# Patient Record
Sex: MALE | Race: OTHER | NOT HISPANIC OR LATINO | Employment: OTHER | URBAN - METROPOLITAN AREA
[De-identification: names, ages, dates, MRNs, and addresses within clinical notes are randomized per-mention and may not be internally consistent; named-entity substitution may affect disease eponyms.]

---

## 2017-03-20 ENCOUNTER — TELEPHONE (OUTPATIENT)
Dept: PULMONOLOGY | Facility: HOSPICE | Age: 67
End: 2017-03-20

## 2017-03-20 DIAGNOSIS — R06.00 DYSPNEA, UNSPECIFIED TYPE: ICD-10-CM

## 2017-05-08 ENCOUNTER — OFFICE VISIT (OUTPATIENT)
Dept: PULMONOLOGY | Facility: HOSPICE | Age: 67
End: 2017-05-08
Payer: COMMERCIAL

## 2017-05-08 ENCOUNTER — NON-PROVIDER VISIT (OUTPATIENT)
Dept: PULMONOLOGY | Facility: HOSPICE | Age: 67
End: 2017-05-08
Payer: COMMERCIAL

## 2017-05-08 VITALS
TEMPERATURE: 97.9 F | BODY MASS INDEX: 36.58 KG/M2 | DIASTOLIC BLOOD PRESSURE: 74 MMHG | WEIGHT: 276 LBS | HEART RATE: 80 BPM | HEIGHT: 73 IN | RESPIRATION RATE: 18 BRPM | SYSTOLIC BLOOD PRESSURE: 120 MMHG

## 2017-05-08 DIAGNOSIS — G47.33 OSA (OBSTRUCTIVE SLEEP APNEA): ICD-10-CM

## 2017-05-08 DIAGNOSIS — R06.00 DYSPNEA, UNSPECIFIED TYPE: ICD-10-CM

## 2017-05-08 DIAGNOSIS — J45.30 MILD PERSISTENT ASTHMA WITHOUT COMPLICATION: ICD-10-CM

## 2017-05-08 DIAGNOSIS — J30.1 SEASONAL ALLERGIC RHINITIS DUE TO POLLEN: ICD-10-CM

## 2017-05-08 PROCEDURE — 99214 OFFICE O/P EST MOD 30 MIN: CPT | Mod: 25 | Performed by: INTERNAL MEDICINE

## 2017-05-08 PROCEDURE — 94060 EVALUATION OF WHEEZING: CPT | Performed by: INTERNAL MEDICINE

## 2017-05-08 ASSESSMENT — PULMONARY FUNCTION TESTS
FEV1: 3.03
FEV1: 3.13
FEV1_PREDICTED: 80
FEV1/FVC_PREDICTED: 74.56
FVC_PREDICTED: 5.07
FEV1_PERCENT_CHANGE: 3
FEV1/FVC: 82.37
FEV1/FVC: 80
FVC_PERCENT_PREDICTED: 75
FEV1_PREDICTED: 3.78
FEV1/FVC_PERCENT_CHANGE: 0
FEV1_PERCENT_CHANGE: 0
FVC: 3.81
FVC: 3.8
FEV1/FVC_PERCENT_PREDICTED: 107

## 2017-05-08 NOTE — MR AVS SNAPSHOT
"        Harrison Gan   2017 1:40 PM   Office Visit   MRN: [id number]    Department:  [facility]   Dept Phone:  [phone]    Description:  Male : 1950   Provider:  Sara Perez M.D.           Reason for Visit     Results PFT results.      Allergies as of 2017     Allergen Noted Reactions    Demerol 2011       Lisinopril 2011         You were diagnosed with     Mild persistent asthma without complication   [[id number]]       FOREST (obstructive sleep apnea)   [[id number]]         Vital Signs     Blood Pressure Pulse Temperature Respirations Height Weight    120/74 mmHg 80 36.6 °C (97.9 °F) (Temporal) 18 1.854 m (6' 0.99\") 125.193 kg (276 lb)    Body Mass Index Smoking Status                36.42 kg/m2 Never Smoker           Basic Information     Date Of Birth Sex Race Ethnicity Preferred Language    1950 Male  or   Origin (Kosovan,Saudi Arabian,Marshallese,Cypriot, etc) English      Health Maintenance        Date Due Completion Dates    IMM DTaP/Tdap/Td Vaccine (1 - Tdap) 1969 ---    COLONOSCOPY 2000 ---    IMM ZOSTER VACCINE 2010 ---    IMM PNEUMOCOCCAL 65+ (ADULT) LOW/MEDIUM RISK SERIES (1 of 2 - PCV13) 2015 ---            Current Immunizations     Influenza TIV (IM) 10/1/2016      Below and/or attached are the medications your provider expects you to take. Review all of your home medications and newly ordered medications with your provider and/or pharmacist. Follow medication instructions as directed by your provider and/or pharmacist. Please keep your medication list with you and share with your provider. Update the information when medications are discontinued, doses are changed, or new medications (including over-the-counter products) are added; and carry medication information at all times in the event of emergency situations     Allergies:  DEMEROL - (reactions not documented)     LISINOPRIL - (reactions not documented)        "      Medications  Valid as of: May 08, 2017 -  2:20 PM    Generic Name Brand Name Tablet Size Instructions for use    Beclomethasone Dipropionate (Aero Soln) QVAR 80 MCG/ACT Inhale 1 Puff by mouth 2 times a day.        Cetirizine HCl (Tab) ZYRTEC 10 MG Take 10 mg by mouth every day.        Cetirizine HCl (Cap) Cetirizine HCl 10 MG Take  by mouth.        Cyclobenzaprine HCl (Tab) FLEXERIL 10 MG Take 10 mg by mouth 3 times a day as needed.        Esomeprazole Magnesium (CAPSULE DELAYED RELEASE) NEXIUM 40 MG Take 40 mg by mouth every morning before breakfast.        Levalbuterol Tartrate (Aerosol) XOPENEX HFA 45 MCG/ACT Inhale 1-2 Puffs by mouth every four hours as needed.        Mometasone Furoate (AEROSOL POWDER, BREATH ACTIVATED) Mometasone Furoate 110 MCG/INH Inhale  by mouth.        Montelukast Sodium (Tab) SINGULAIR 10 MG Take 10 mg by mouth every day.        Oxycodone-Acetaminophen (Tab) PERCOCET 5-325 MG Take 1-2 Tabs by mouth every four hours as needed.        Rosuvastatin Calcium (Tab) CRESTOR 20 MG Take 20 mg by mouth every evening.        Sennosides-Docusate Sodium (Tab) SENOKOT-S 8.6-50 MG Take 2 Tabs by mouth every day.        .                 Medicines prescribed today were sent to:     [facility] [address] [address]    Phone: [phone] Fax: [phone]    Open 24 Hours?: No      Medication refill instructions:       If your prescription bottle indicates you have medication refills left, it is not necessary to call your provider’s office. Please contact your pharmacy and they will refill your medication.    If your prescription bottle indicates you do not have any refills left, you may request refills at any time through one of the following ways: The online Fullscreen system (except Urgent Care), by calling your provider’s office, or by asking your pharmacy to contact your provider’s office with a refill request. Medication  refills are processed only during regular business hours and may not be available until the next business day. Your provider may request additional information or to have a follow-up visit with you prior to refilling your medication.   *Please Note: Medication refills are assigned a new Rx number when refilled electronically. Your pharmacy may indicate that no refills were authorized even though a new prescription for the same medication is available at the pharmacy. Please request the medicine by name with the pharmacy before contacting your provider for a refill.        Your To Do List     Future Labs/Procedures Complete By Expires    OVERNIGHT HOME SLEEP STUDY  As directed 5/8/2018    Comments:    By DME as pt in [facility] Status: Patient Declined

## 2017-05-08 NOTE — MR AVS SNAPSHOT
Harrison Gan   2017 1:00 PM   Non-Provider Visit   MRN: [id number]    Department:  [facility]   Dept Phone:  [phone]    Description:  Male : 1950   Provider:  Sara Perez M.D.           Reason for Visit     Shortness of Breath           Allergies as of 2017     Allergen Noted Reactions    Demerol 2011       Lisinopril 2011         You were diagnosed with     Dyspnea, unspecified type   [[id number]]         Basic Information     Date Of Birth Sex Race Ethnicity Preferred Language    1950 Male  or   Origin (French,German,Sammarinese,Libyan, etc) English      Your appointments     May 08, 2017  1:40 PM   Established Patient Pul with Sara Perez M.D.   [facility] (--)    [address]  [address]  [facility] [id number] [phone]              Health Maintenance        Date Due Completion Dates    IMM DTaP/Tdap/Td Vaccine (1 - Tdap) 1969 ---    COLONOSCOPY 2000 ---    IMM ZOSTER VACCINE 2010 ---    IMM PNEUMOCOCCAL 65+ (ADULT) LOW/MEDIUM RISK SERIES (1 of 2 - PCV13) 2015 ---            Current Immunizations     No immunizations on file.      Below and/or attached are the medications your provider expects you to take. Review all of your home medications and newly ordered medications with your provider and/or pharmacist. Follow medication instructions as directed by your provider and/or pharmacist. Please keep your medication list with you and share with your provider. Update the information when medications are discontinued, doses are changed, or new medications (including over-the-counter products) are added; and carry medication information at all times in the event of emergency situations     Allergies:  DEMEROL - (reactions not documented)     LISINOPRIL - (reactions not documented)               Medications  Valid as of: May 08, 2017 -  1:32 PM    Generic Name Brand Name Tablet  Size Instructions for use    Beclomethasone Dipropionate (Aero Soln) QVAR 80 MCG/ACT Inhale 1 Puff by mouth 2 times a day.        Cetirizine HCl (Tab) ZYRTEC 10 MG Take 10 mg by mouth every day.        Cetirizine HCl (Cap) Cetirizine HCl 10 MG Take  by mouth.        Cyclobenzaprine HCl (Tab) FLEXERIL 10 MG Take 10 mg by mouth 3 times a day as needed.        Esomeprazole Magnesium (CAPSULE DELAYED RELEASE) NEXIUM 40 MG Take 40 mg by mouth every morning before breakfast.        Levalbuterol Tartrate (Aerosol) XOPENEX HFA 45 MCG/ACT Inhale 1-2 Puffs by mouth every four hours as needed.        Mometasone Furoate (AEROSOL POWDER, BREATH ACTIVATED) Mometasone Furoate 110 MCG/INH Inhale  by mouth.        Montelukast Sodium (Tab) SINGULAIR 10 MG Take 10 mg by mouth every day.        Oxycodone-Acetaminophen (Tab) PERCOCET 5-325 MG Take 1-2 Tabs by mouth every four hours as needed.        Rosuvastatin Calcium (Tab) CRESTOR 20 MG Take 20 mg by mouth every evening.        Sennosides-Docusate Sodium (Tab) SENOKOT-S 8.6-50 MG Take 2 Tabs by mouth every day.        .                 Medicines prescribed today were sent to:     None      Medication refill instructions:       If your prescription bottle indicates you have medication refills left, it is not necessary to call your provider’s office. Please contact your pharmacy and they will refill your medication.    If your prescription bottle indicates you do not have any refills left, you may request refills at any time through one of the following ways: The online [employer] system (except Urgent Care), by calling your provider’s office, or by asking your pharmacy to contact your provider’s office with a refill request. Medication refills are processed only during regular business hours and may not be available until the next business day. Your provider may request additional information or to have a follow-up visit with you prior to refilling your medication.   *Please Note:  Medication refills are assigned a new Rx number when refilled electronically. Your pharmacy may indicate that no refills were authorized even though a new prescription for the same medication is available at the pharmacy. Please request the medicine by name with the pharmacy before contacting your provider for a refill.           MyChart Status: Patient Declined

## 2017-05-08 NOTE — PROCEDURES
Good patient effort & cooperation.  The results of this test meet the ATS standards for acceptability and repeatability.  Test was performed on the [employer] Interpreting/D spirometry system.  Predicted values were N-Dianne.  A bronchodilator of Xopenex HFA - 2 puff with a spacer was administered to the patient, due to patient complaint of shakiness or cardiac history.    The FVC is 3.8 L or 74%, FEV1 is 3.13 L or 82%, FEV1/FVC 82%.    Interpretation:  Reduced vital capacity secondary to BMI 36.   No significant obstructive ventilatory defect.  No significant bronchodilator response.

## 2017-05-09 ENCOUNTER — TELEPHONE (OUTPATIENT)
Dept: PULMONOLOGY | Facility: HOSPICE | Age: 67
End: 2017-05-09

## 2020-03-24 ENCOUNTER — HOSPITAL ENCOUNTER (OUTPATIENT)
Dept: LAB | Facility: MEDICAL CENTER | Age: 70
End: 2020-03-24
Attending: UROLOGY
Payer: MEDICARE

## 2020-03-24 LAB
ANION GAP SERPL CALC-SCNC: 12 MMOL/L (ref 7–16)
APTT PPP: 27.9 SEC (ref 24.7–36)
BASOPHILS # BLD AUTO: 1.4 % (ref 0–1.8)
BASOPHILS # BLD: 0.12 K/UL (ref 0–0.12)
BUN SERPL-MCNC: 21 MG/DL (ref 8–22)
CALCIUM SERPL-MCNC: 10.1 MG/DL (ref 8.5–10.5)
CHLORIDE SERPL-SCNC: 106 MMOL/L (ref 96–112)
CO2 SERPL-SCNC: 21 MMOL/L (ref 20–33)
CREAT SERPL-MCNC: 1.85 MG/DL (ref 0.5–1.4)
EOSINOPHIL # BLD AUTO: 0.57 K/UL (ref 0–0.51)
EOSINOPHIL NFR BLD: 6.5 % (ref 0–6.9)
ERYTHROCYTE [DISTWIDTH] IN BLOOD BY AUTOMATED COUNT: 42.3 FL (ref 35.9–50)
GLUCOSE SERPL-MCNC: 126 MG/DL (ref 65–99)
HCT VFR BLD AUTO: 45.4 % (ref 42–52)
HGB BLD-MCNC: 15 G/DL (ref 14–18)
IMM GRANULOCYTES # BLD AUTO: 0.04 K/UL (ref 0–0.11)
IMM GRANULOCYTES NFR BLD AUTO: 0.5 % (ref 0–0.9)
INR PPP: 0.96 (ref 0.87–1.13)
LYMPHOCYTES # BLD AUTO: 2.11 K/UL (ref 1–4.8)
LYMPHOCYTES NFR BLD: 24.2 % (ref 22–41)
MCH RBC QN AUTO: 28.7 PG (ref 27–33)
MCHC RBC AUTO-ENTMCNC: 33 G/DL (ref 33.7–35.3)
MCV RBC AUTO: 86.8 FL (ref 81.4–97.8)
MONOCYTES # BLD AUTO: 0.73 K/UL (ref 0–0.85)
MONOCYTES NFR BLD AUTO: 8.4 % (ref 0–13.4)
NEUTROPHILS # BLD AUTO: 5.14 K/UL (ref 1.82–7.42)
NEUTROPHILS NFR BLD: 59 % (ref 44–72)
NRBC # BLD AUTO: 0 K/UL
NRBC BLD-RTO: 0 /100 WBC
PLATELET # BLD AUTO: 247 K/UL (ref 164–446)
PMV BLD AUTO: 9.7 FL (ref 9–12.9)
POTASSIUM SERPL-SCNC: 4.7 MMOL/L (ref 3.6–5.5)
PROTHROMBIN TIME: 13 SEC (ref 12–14.6)
RBC # BLD AUTO: 5.23 M/UL (ref 4.7–6.1)
SODIUM SERPL-SCNC: 139 MMOL/L (ref 135–145)
WBC # BLD AUTO: 8.7 K/UL (ref 4.8–10.8)

## 2020-03-24 PROCEDURE — 80048 BASIC METABOLIC PNL TOTAL CA: CPT

## 2020-03-24 PROCEDURE — 87086 URINE CULTURE/COLONY COUNT: CPT

## 2020-03-24 PROCEDURE — 85730 THROMBOPLASTIN TIME PARTIAL: CPT

## 2020-03-24 PROCEDURE — 85610 PROTHROMBIN TIME: CPT

## 2020-03-24 PROCEDURE — 85025 COMPLETE CBC W/AUTO DIFF WBC: CPT

## 2020-03-26 LAB
BACTERIA UR CULT: NORMAL
SIGNIFICANT IND 70042: NORMAL
SITE SITE: NORMAL
SOURCE SOURCE: NORMAL

## 2020-04-03 RX ORDER — AZELASTINE HCL 205.5 UG/1
SPRAY NASAL PRN
Status: ON HOLD | COMMUNITY
End: 2020-04-06

## 2020-04-03 RX ORDER — NEBIVOLOL HYDROCHLORIDE 5 MG/1
5 TABLET ORAL
COMMUNITY

## 2020-04-03 RX ORDER — FINASTERIDE 5 MG/1
5 TABLET, FILM COATED ORAL
COMMUNITY

## 2020-04-03 RX ORDER — MELOXICAM 15 MG/1
15 TABLET ORAL DAILY
Status: ON HOLD | COMMUNITY
End: 2021-02-26

## 2020-04-03 RX ORDER — LOSARTAN POTASSIUM 50 MG/1
50 TABLET ORAL
COMMUNITY

## 2020-04-03 RX ORDER — EVOLOCUMAB 140 MG/ML
140 INJECTION, SOLUTION SUBCUTANEOUS
COMMUNITY

## 2020-04-03 RX ORDER — TAMSULOSIN HYDROCHLORIDE 0.4 MG/1
0.4 CAPSULE ORAL DAILY
COMMUNITY

## 2020-04-05 ENCOUNTER — HOSPITAL ENCOUNTER (OUTPATIENT)
Dept: RADIOLOGY | Facility: MEDICAL CENTER | Age: 70
End: 2020-04-05
Payer: MEDICARE

## 2020-04-05 NOTE — OR NURSING
COVID-19 Pre-surgery screening:  ?     1. Do you have an undiagnosed respiratory illness or symptoms such as coughing or sneezing? NO                2. Do you have an unexplained fever greater than 100.4 degrees Fahrenheit or 38 degrees Celsius?     No  ?     3. Have you had direct exposure to a patient who tested positive for Covid-19?     No  ?     4. Have you traveled within the last 14 days to [place], [place], [place], [place], or [place]?     No  ?  Informed of visitor policy    SCREENING COMPLETE. PASSED SCREENING

## 2020-04-06 ENCOUNTER — APPOINTMENT (OUTPATIENT)
Dept: RADIOLOGY | Facility: MEDICAL CENTER | Age: 70
End: 2020-04-06
Attending: UROLOGY
Payer: MEDICARE

## 2020-04-06 ENCOUNTER — HOSPITAL ENCOUNTER (OUTPATIENT)
Facility: MEDICAL CENTER | Age: 70
End: 2020-04-06
Attending: UROLOGY | Admitting: UROLOGY
Payer: MEDICARE

## 2020-04-06 ENCOUNTER — ANESTHESIA (OUTPATIENT)
Dept: SURGERY | Facility: MEDICAL CENTER | Age: 70
End: 2020-04-06
Payer: MEDICARE

## 2020-04-06 ENCOUNTER — ANESTHESIA EVENT (OUTPATIENT)
Dept: SURGERY | Facility: MEDICAL CENTER | Age: 70
End: 2020-04-06
Payer: MEDICARE

## 2020-04-06 VITALS
HEIGHT: 74 IN | HEART RATE: 73 BPM | RESPIRATION RATE: 16 BRPM | WEIGHT: 278 LBS | BODY MASS INDEX: 35.68 KG/M2 | DIASTOLIC BLOOD PRESSURE: 76 MMHG | OXYGEN SATURATION: 95 % | TEMPERATURE: 97 F | SYSTOLIC BLOOD PRESSURE: 131 MMHG

## 2020-04-06 LAB
APPEARANCE UR: CLEAR
BILIRUB UR QL STRIP.AUTO: NEGATIVE
COLOR UR: YELLOW
EKG IMPRESSION: NORMAL
GLUCOSE BLD-MCNC: 131 MG/DL (ref 65–99)
GLUCOSE UR STRIP.AUTO-MCNC: NEGATIVE MG/DL
KETONES UR STRIP.AUTO-MCNC: NEGATIVE MG/DL
LEUKOCYTE ESTERASE UR QL STRIP.AUTO: NEGATIVE
MICRO URNS: NORMAL
NITRITE UR QL STRIP.AUTO: NEGATIVE
PH UR STRIP.AUTO: 5.5 [PH] (ref 5–8)
PROT UR QL STRIP: NEGATIVE MG/DL
RBC UR QL AUTO: NEGATIVE
SP GR UR STRIP.AUTO: 1.02
UROBILINOGEN UR STRIP.AUTO-MCNC: 0.2 MG/DL

## 2020-04-06 PROCEDURE — C1769 GUIDE WIRE: HCPCS | Performed by: UROLOGY

## 2020-04-06 PROCEDURE — C2617 STENT, NON-COR, TEM W/O DEL: HCPCS | Performed by: UROLOGY

## 2020-04-06 PROCEDURE — 160025 RECOVERY II MINUTES (STATS): Performed by: UROLOGY

## 2020-04-06 PROCEDURE — 160002 HCHG RECOVERY MINUTES (STAT): Performed by: UROLOGY

## 2020-04-06 PROCEDURE — 81003 URINALYSIS AUTO W/O SCOPE: CPT

## 2020-04-06 PROCEDURE — 160035 HCHG PACU - 1ST 60 MINS PHASE I: Performed by: UROLOGY

## 2020-04-06 PROCEDURE — 700102 HCHG RX REV CODE 250 W/ 637 OVERRIDE(OP): Performed by: UROLOGY

## 2020-04-06 PROCEDURE — 700111 HCHG RX REV CODE 636 W/ 250 OVERRIDE (IP)

## 2020-04-06 PROCEDURE — 93005 ELECTROCARDIOGRAM TRACING: CPT | Performed by: UROLOGY

## 2020-04-06 PROCEDURE — 160029 HCHG SURGERY MINUTES - 1ST 30 MINS LEVEL 4: Performed by: UROLOGY

## 2020-04-06 PROCEDURE — 160046 HCHG PACU - 1ST 60 MINS PHASE II: Performed by: UROLOGY

## 2020-04-06 PROCEDURE — 160036 HCHG PACU - EA ADDL 30 MINS PHASE I: Performed by: UROLOGY

## 2020-04-06 PROCEDURE — 700105 HCHG RX REV CODE 258: Performed by: UROLOGY

## 2020-04-06 PROCEDURE — 160048 HCHG OR STATISTICAL LEVEL 1-5: Performed by: UROLOGY

## 2020-04-06 PROCEDURE — 82962 GLUCOSE BLOOD TEST: CPT

## 2020-04-06 PROCEDURE — 160041 HCHG SURGERY MINUTES - EA ADDL 1 MIN LEVEL 4: Performed by: UROLOGY

## 2020-04-06 PROCEDURE — 700111 HCHG RX REV CODE 636 W/ 250 OVERRIDE (IP): Performed by: ANESTHESIOLOGY

## 2020-04-06 PROCEDURE — 93010 ELECTROCARDIOGRAM REPORT: CPT | Performed by: INTERNAL MEDICINE

## 2020-04-06 PROCEDURE — 160009 HCHG ANES TIME/MIN: Performed by: UROLOGY

## 2020-04-06 PROCEDURE — A9270 NON-COVERED ITEM OR SERVICE: HCPCS | Performed by: UROLOGY

## 2020-04-06 DEVICE — STENT UROLOGICAL POLARIS 6X28  ULTRA: Type: IMPLANTABLE DEVICE | Site: URETER | Status: FUNCTIONAL

## 2020-04-06 RX ORDER — DEXAMETHASONE SODIUM PHOSPHATE 4 MG/ML
INJECTION, SOLUTION INTRA-ARTICULAR; INTRALESIONAL; INTRAMUSCULAR; INTRAVENOUS; SOFT TISSUE PRN
Status: DISCONTINUED | OUTPATIENT
Start: 2020-04-06 | End: 2020-04-06 | Stop reason: SURG

## 2020-04-06 RX ORDER — KETOROLAC TROMETHAMINE 30 MG/ML
INJECTION, SOLUTION INTRAMUSCULAR; INTRAVENOUS PRN
Status: DISCONTINUED | OUTPATIENT
Start: 2020-04-06 | End: 2020-04-06 | Stop reason: SURG

## 2020-04-06 RX ORDER — OXYCODONE HCL 5 MG/5 ML
5 SOLUTION, ORAL ORAL
Status: DISCONTINUED | OUTPATIENT
Start: 2020-04-06 | End: 2020-04-06 | Stop reason: HOSPADM

## 2020-04-06 RX ORDER — METOCLOPRAMIDE HYDROCHLORIDE 5 MG/ML
INJECTION INTRAMUSCULAR; INTRAVENOUS PRN
Status: DISCONTINUED | OUTPATIENT
Start: 2020-04-06 | End: 2020-04-06 | Stop reason: SURG

## 2020-04-06 RX ORDER — PHENAZOPYRIDINE HYDROCHLORIDE 100 MG/1
200 TABLET, FILM COATED ORAL
Status: COMPLETED | OUTPATIENT
Start: 2020-04-06 | End: 2020-04-06

## 2020-04-06 RX ORDER — ONDANSETRON 2 MG/ML
4 INJECTION INTRAMUSCULAR; INTRAVENOUS
Status: DISCONTINUED | OUTPATIENT
Start: 2020-04-06 | End: 2020-04-06 | Stop reason: HOSPADM

## 2020-04-06 RX ORDER — LIDOCAINE HYDROCHLORIDE 10 MG/ML
INJECTION, SOLUTION EPIDURAL; INFILTRATION; INTRACAUDAL; PERINEURAL
Status: COMPLETED
Start: 2020-04-06 | End: 2020-04-06

## 2020-04-06 RX ORDER — PHENYLEPHRINE HYDROCHLORIDE 10 MG/ML
INJECTION, SOLUTION INTRAMUSCULAR; INTRAVENOUS; SUBCUTANEOUS PRN
Status: DISCONTINUED | OUTPATIENT
Start: 2020-04-06 | End: 2020-04-06 | Stop reason: SURG

## 2020-04-06 RX ORDER — MORPHINE SULFATE 10 MG/ML
5 INJECTION, SOLUTION INTRAMUSCULAR; INTRAVENOUS
Status: DISCONTINUED | OUTPATIENT
Start: 2020-04-06 | End: 2020-04-06 | Stop reason: HOSPADM

## 2020-04-06 RX ORDER — CEFAZOLIN SODIUM 1 G/3ML
INJECTION, POWDER, FOR SOLUTION INTRAMUSCULAR; INTRAVENOUS PRN
Status: DISCONTINUED | OUTPATIENT
Start: 2020-04-06 | End: 2020-04-06 | Stop reason: SURG

## 2020-04-06 RX ORDER — MORPHINE SULFATE 4 MG/ML
1 INJECTION, SOLUTION INTRAMUSCULAR; INTRAVENOUS
Status: DISCONTINUED | OUTPATIENT
Start: 2020-04-06 | End: 2020-04-06 | Stop reason: HOSPADM

## 2020-04-06 RX ORDER — MORPHINE SULFATE 4 MG/ML
2 INJECTION, SOLUTION INTRAMUSCULAR; INTRAVENOUS
Status: DISCONTINUED | OUTPATIENT
Start: 2020-04-06 | End: 2020-04-06 | Stop reason: HOSPADM

## 2020-04-06 RX ORDER — ONDANSETRON 2 MG/ML
INJECTION INTRAMUSCULAR; INTRAVENOUS PRN
Status: DISCONTINUED | OUTPATIENT
Start: 2020-04-06 | End: 2020-04-06 | Stop reason: SURG

## 2020-04-06 RX ORDER — SODIUM CHLORIDE, SODIUM LACTATE, POTASSIUM CHLORIDE, CALCIUM CHLORIDE 600; 310; 30; 20 MG/100ML; MG/100ML; MG/100ML; MG/100ML
INJECTION, SOLUTION INTRAVENOUS CONTINUOUS
Status: DISCONTINUED | OUTPATIENT
Start: 2020-04-06 | End: 2020-04-06 | Stop reason: HOSPADM

## 2020-04-06 RX ORDER — HALOPERIDOL 5 MG/ML
1 INJECTION INTRAMUSCULAR
Status: DISCONTINUED | OUTPATIENT
Start: 2020-04-06 | End: 2020-04-06 | Stop reason: HOSPADM

## 2020-04-06 RX ORDER — HYDRALAZINE HYDROCHLORIDE 20 MG/ML
5 INJECTION INTRAMUSCULAR; INTRAVENOUS
Status: DISCONTINUED | OUTPATIENT
Start: 2020-04-06 | End: 2020-04-06 | Stop reason: HOSPADM

## 2020-04-06 RX ORDER — DIPHENHYDRAMINE HYDROCHLORIDE 50 MG/ML
12.5 INJECTION INTRAMUSCULAR; INTRAVENOUS
Status: DISCONTINUED | OUTPATIENT
Start: 2020-04-06 | End: 2020-04-06 | Stop reason: HOSPADM

## 2020-04-06 RX ORDER — METOPROLOL TARTRATE 1 MG/ML
1 INJECTION, SOLUTION INTRAVENOUS
Status: DISCONTINUED | OUTPATIENT
Start: 2020-04-06 | End: 2020-04-06 | Stop reason: HOSPADM

## 2020-04-06 RX ORDER — LABETALOL HYDROCHLORIDE 5 MG/ML
5 INJECTION, SOLUTION INTRAVENOUS
Status: DISCONTINUED | OUTPATIENT
Start: 2020-04-06 | End: 2020-04-06 | Stop reason: HOSPADM

## 2020-04-06 RX ORDER — OXYCODONE HCL 5 MG/5 ML
10 SOLUTION, ORAL ORAL
Status: DISCONTINUED | OUTPATIENT
Start: 2020-04-06 | End: 2020-04-06 | Stop reason: HOSPADM

## 2020-04-06 RX ADMIN — METOCLOPRAMIDE 10 MG: 5 INJECTION, SOLUTION INTRAMUSCULAR; INTRAVENOUS at 09:45

## 2020-04-06 RX ADMIN — LIDOCAINE HYDROCHLORIDE 0.5 ML: 10 INJECTION, SOLUTION EPIDURAL; INFILTRATION; INTRACAUDAL; PERINEURAL at 08:25

## 2020-04-06 RX ADMIN — PHENYLEPHRINE HYDROCHLORIDE 100 MCG: 10 INJECTION INTRAVENOUS at 10:23

## 2020-04-06 RX ADMIN — PHENYLEPHRINE HYDROCHLORIDE 100 MCG: 10 INJECTION INTRAVENOUS at 10:16

## 2020-04-06 RX ADMIN — Medication 0.5 ML: at 08:25

## 2020-04-06 RX ADMIN — PROPOFOL 30 MG: 10 INJECTION, EMULSION INTRAVENOUS at 10:32

## 2020-04-06 RX ADMIN — DEXAMETHASONE SODIUM PHOSPHATE 4 MG: 4 INJECTION, SOLUTION INTRA-ARTICULAR; INTRALESIONAL; INTRAMUSCULAR; INTRAVENOUS; SOFT TISSUE at 09:36

## 2020-04-06 RX ADMIN — SODIUM CHLORIDE, POTASSIUM CHLORIDE, SODIUM LACTATE AND CALCIUM CHLORIDE: 600; 310; 30; 20 INJECTION, SOLUTION INTRAVENOUS at 08:25

## 2020-04-06 RX ADMIN — PROPOFOL 200 MG: 10 INJECTION, EMULSION INTRAVENOUS at 09:33

## 2020-04-06 RX ADMIN — KETOROLAC TROMETHAMINE 15 MG: 30 INJECTION, SOLUTION INTRAMUSCULAR at 10:32

## 2020-04-06 RX ADMIN — ONDANSETRON 4 MG: 2 INJECTION INTRAMUSCULAR; INTRAVENOUS at 10:32

## 2020-04-06 RX ADMIN — PHENAZOPYRIDINE HYDROCHLORIDE 200 MG: 100 TABLET ORAL at 12:15

## 2020-04-06 RX ADMIN — CEFAZOLIN 3 G: 330 INJECTION, POWDER, FOR SOLUTION INTRAMUSCULAR; INTRAVENOUS at 09:35

## 2020-04-06 ASSESSMENT — PAIN SCALES - GENERAL: PAIN_LEVEL: 1

## 2020-04-06 NOTE — ANESTHESIA PROCEDURE NOTES
Airway  Date/Time: 4/6/2020 9:33 AM  Performed by: Edson Bear M.D.  Authorized by: Jered Mendiola M.D.     Location:  OR  Urgency:  Elective  Indications for Airway Management:  Anesthesia      Spontaneous Ventilation: absent    Sedation Level:  Deep  Preoxygenated: Yes    Mask Difficulty Assessment:  0 - not attempted  Final Airway Type:  Supraglottic airway  Final Supraglottic Airway:  Standard LMA  SGA Size:  5  Number of Attempts at Approach:  1

## 2020-04-06 NOTE — ANESTHESIA TIME REPORT
Anesthesia Start and Stop Event Times     Date Time Event    4/6/2020 0924 Ready for Procedure     0927 Anesthesia Start     1051 Anesthesia Stop        Responsible Staff  04/06/20    Name Role Begin End    Jered Mendiola M.D. Anesth 0927 1051    Edson Bear M.D. Anesth 0927 1051        Preop Diagnosis (Free Text):  Pre-op Diagnosis     URETERIC STONE (TIER 3A CTABD AND PELVIS 4.5 MM WIDE BY 8 MM LONG OBSTRUCTING STONE IN DISTAL LEFT URETER WITH MILD HYDRONEPHROSIS        Preop Diagnosis (Codes):    Post op Diagnosis  Ureteral stone with hydronephrosis      Premium Reason  Non-Premium    Comments: proctored case

## 2020-04-06 NOTE — DISCHARGE INSTRUCTIONS
ACTIVITY: Rest and take it easy for the first 24 hours.  A responsible adult is recommended to remain with you during that time.  It is normal to feel sleepy.  We encourage you to not do anything that requires balance, judgment or coordination.    MILD FLU-LIKE SYMPTOMS ARE NORMAL. YOU MAY EXPERIENCE GENERALIZED MUSCLE ACHES, THROAT IRRITATION, HEADACHE AND/OR SOME NAUSEA.    FOR 24 HOURS DO NOT:  Drive, operate machinery or run household appliances.  Drink beer or alcoholic beverages.   Make important decisions or sign legal documents.    SPECIAL INSTRUCTIONS: Follow MD instructions    Cystoscopy  Cystoscopy is a procedure that is used to help diagnose and sometimes treat conditions that affect that lower urinary tract. The lower urinary tract includes the bladder and the tube that drains urine from the bladder out of the body (urethra). Cystoscopy is performed with a thin, tube-shaped instrument with a light and camera at the end (cystoscope). The cystoscope may be hard (rigid) or flexible, depending on the goal of the procedure. The cystoscope is inserted through the urethra, into the bladder.  Cystoscopy may be recommended if you have:  · Urinary tractinfections that keep coming back (recurring).  · Blood in the urine (hematuria).  · Loss of bladder control (urinary incontinence) or an overactive bladder.  · Unusual cells found in a urine sample.  · A blockage in the urethra.  · Painful urination.  · An abnormality in the bladder found during an intravenous pyelogram (IVP) or CT scan.  Cystoscopy may also be done to remove a sample of tissue to be examined under a microscope (biopsy).  Tell a health care provider about:  · Any allergies you have.  · All medicines you are taking, including vitamins, herbs, eye drops, creams, and over-the-counter medicines.  · Any problems you or family members have had with anesthetic medicines.  · Any blood disorders you have.  · Any surgeries you have had.  · Any medical  conditions you have.  · Whether you are pregnant or may be pregnant.  What are the risks?  Generally, this is a safe procedure. However, problems may occur, including:  · Infection.  · Bleeding.  · Allergic reactions to medicines.  · Damage to other structures or organs.  What happens before the procedure?  · Ask your health care provider about:  ¨ Changing or stopping your regular medicines. This is especially important if you are taking diabetes medicines or blood thinners.  ¨ Taking medicines such as aspirin and ibuprofen. These medicines can thin your blood. Do not take these medicines before your procedure if your health care provider instructs you not to.  · Follow instructions from your health care provider about eating or drinking restrictions.  · You may be given antibiotic medicine to help prevent infection.  · You may have an exam or testing, such as X-rays of the bladder, urethra, or kidneys.  · You may have urine tests to check for signs of infection.  · Plan to have someone take you home after the procedure.  What happens during the procedure?  · To reduce your risk of infection, your health care team will wash or sanitize their hands.  · You will be given one or more of the following:  ¨ A medicine to help you relax (sedative).  ¨ A medicine to numb the area (local anesthetic).  · The area around the opening of your urethra will be cleaned.  · The cystoscope will be passed through your urethra into your bladder.  · Germ-free (sterile) fluid will flow through the cystoscope to fill your bladder. The fluid will stretch your bladder so that your surgeon can clearly examine your bladder walls.  · The cystoscope will be removed and your bladder will be emptied.  The procedure may vary among health care providers and hospitals.  What happens after the procedure?  · You may have some soreness or pain in your abdomen and urethra. Medicines will be available to help you.  · You may have some blood in your  urine.  · Do not drive for 24 hours if you received a sedative.  This information is not intended to replace advice given to you by your health care provider. Make sure you discuss any questions you have with your health care provider.  Lithotripsy  Lithotripsy is a treatment that can sometimes help eliminate kidney stones and pain that they cause. A form of lithotripsy, also known as extracorporeal shock wave lithotripsy, is a nonsurgical procedure that helps your body rid itself of the kidney stone when it is too big to pass on its own. Extracorporeal shock wave lithotripsy is a method of crushing a kidney stone with shock waves. These shock waves pass through your body and are focused on your stone. They cause the kidney stones to crumble while still in the urinary tract. It is then easier for the smaller pieces of stone to pass in the urine.  Lithotripsy usually takes about an hour. It is done in a hospital, a lithotripsy center, or a mobile unit. It usually does not require an overnight stay. Your health care provider will instruct you on preparation for the procedure. Your health care provider will tell you what to expect afterward.  LET YOUR HEALTH CARE PROVIDER KNOW ABOUT:  · Any allergies you have.  · All medicines you are taking, including vitamins, herbs, eye drops, creams, and over-the-counter medicines.  · Previous problems you or members of your family have had with the use of anesthetics.  · Any blood disorders you have.  · Previous surgeries you have had.  · Medical conditions you have.  RISKS AND COMPLICATIONS  Generally, lithotripsy for kidney stones is a safe procedure. However, as with any procedure, complications can occur. Possible complications include:  · Infection.  · Bleeding of the kidney.  · Bruising of the kidney or skin.  · Obstruction of the ureter.  · Failure of the stone to fragment.  BEFORE THE PROCEDURE  · Do not eat or drink for 6-8 hours prior to the procedure. You may, however,  take the medications with a sip of water that your physician instructs you to take  · Do not take aspirin or aspirin-containing products for 7 days prior to your procedure  · Do not take nonsteroidal anti-inflammatory products for 7 days prior to your procedure  PROCEDURE  A stent (flexible tube with holes) may be placed in your ureter. The ureter is the tube that transports the urine from the kidneys to the bladder. Your health care provider may place a stent before the procedure. This will help keep urine flowing from the kidney if the fragments of the stone block the ureter. You may have an IV tube placed in one of your veins to give you fluids and medicines. These medicines may help you relax or make you sleep. During the procedure, you will lie comfortably on a fluid-filled cushion or in a warm-water bath. After an X-ray or ultrasound exam to locate your stone, shock waves are aimed at the stone. If you are awake, you may feel a tapping sensation as the shock waves pass through your body. If large stone particles remain after treatment, a second procedure may be necessary at a later date.  For comfort during the test:  · Relax as much as possible.  · Try to remain still as much as possible.  · Try to follow instructions to speed up the test.  · Let your health care provider know if you are uncomfortable, anxious, or in pain.  AFTER THE PROCEDURE   After surgery, you will be taken to the recovery area. A nurse will watch and check your progress. Once you're awake, stable, and taking fluids well, you will be allowed to go home as long as there are no problems. You will also be allowed to pass your urine before discharge. You may be given antibiotics to help prevent infection. You may also be prescribed pain medicine if needed. In a week or two, your health care provider may remove your stent, if you have one. You may first have an X-ray exam to check on how successful the fragmentation of your stone has been and  how much of the stone has passed. Your health care provider will check to see whether or not stone particles remain.  SEEK IMMEDIATE MEDICAL CARE IF:  · You develop a fever or shaking chills.  · Your pain is not relieved by medicine.  · You feel sick to your stomach (nauseated) and you vomit.  · You develop heavy bleeding.  · You have difficulty urinating.  · You start to pass your stent from your penis.  This information is not intended to replace advice given to you by your health care provider. Make sure you discuss any questions you have with your health care provider.        DIET: To avoid nausea, slowly advance diet as tolerated, avoiding spicy or greasy foods for the first day.  Add more substantial food to your diet according to your physician's instructions.  Babies can be fed formula or breast milk as soon as they are hungry.  INCREASE FLUIDS AND FIBER TO AVOID CONSTIPATION.    SURGICAL DRESSING/BATHING: May shower    FOLLOW-UP APPOINTMENT:  A follow-up appointment should be arranged with your doctor in 1-2 weeks; call to schedule. Call MD office for stent removal.     You should CALL YOUR PHYSICIAN if you develop:  Fever greater than 101 degrees F.  Pain not relieved by medication, or persistent nausea or vomiting.  Excessive bleeding (blood soaking through dressing) or unexpected drainage from the wound.  Extreme redness or swelling around the incision site, drainage of pus or foul smelling drainage.  Inability to urinate or empty your bladder within 8 hours.  Problems with breathing or chest pain.    You should call 911 if you develop problems with breathing or chest pain.  If you are unable to contact your doctor or surgical center, you should go to the nearest emergency room or urgent care center.  Physician's telephone #: [phone] [address]    If any questions arise, call your doctor.  If your doctor is not available, please feel free to call the Surgical Center at [phone].  The Center  is open Monday through Friday from 7AM to 7PM.  You can also call the HEALTH HOTLINE open 24 hours/day, 7 days/week and speak to a nurse at [phone], or toll free at [phone].    A registered nurse may call you a few days after your surgery to see how you are doing after your procedure.    MEDICATIONS: Resume taking daily medication.  Take prescribed pain medication with food.  If no medication is prescribed, you may take non-aspirin pain medication if needed.  PAIN MEDICATION CAN BE VERY CONSTIPATING.  Take a stool softener or laxative such as senokot, pericolace, or milk of magnesia if needed.    Pain medication may be taken any time.    If your physician has prescribed pain medication that includes Acetaminophen (Tylenol), do not take additional Acetaminophen (Tylenol) while taking the prescribed medication.    Depression / Suicide Risk    As you are discharged from this [facility] facility, it is important to learn how to keep safe from harming yourself.    Recognize the warning signs:  · Abrupt changes in personality, positive or negative- including increase in energy   · Giving away possessions  · Change in eating patterns- significant weight changes-  positive or negative  · Change in sleeping patterns- unable to sleep or sleeping all the time   · Unwillingness or inability to communicate  · Depression  · Unusual sadness, discouragement and loneliness  · Talk of wanting to die  · Neglect of personal appearance   · Rebelliousness- reckless behavior  · Withdrawal from people/activities they love  · Confusion- inability to concentrate     If you or a loved one observes any of these behaviors or has concerns about self-harm, here's what you can do:  · Talk about it- your feelings and reasons for harming yourself  · Remove any means that you might use to hurt yourself (examples: pills, rope, extension cords, firearm)  · Get professional help from the community (Mental Health, Substance Abuse,  psychological counseling)  · Do not be alone:Call your Safe Contact- someone whom you trust who will be there for you.  · Call your local CRISIS HOTLINE 607-4673 or 855-746-3101  · Call your local Children's Mobile Crisis Response Team [place] (494) 713-2868 or www.[employer]  · Call the toll free National Suicide Prevention Hotlines   · National Suicide Prevention Lifeline 232-451-FOLD (7164)  · National Hope Line Network 800-SUICIDE (721-6109)

## 2020-04-06 NOTE — OP REPORT
DATE OF SERVICE:  04/06/2020    PREOPERATIVE DIAGNOSES:    1.  Left hydronephrosis.    2.  Left distal ureteral calculus.    3.  Intermittent left renal colic.      POSTOPERATIVE DIAGNOSES:    1.  Left hydronephrosis.    2.  Left distal ureteral calculus.    3.  Intermittent left renal colic.      PROCEDURE:  Cystoscopy with left ureteroscopy, lasertripsy of calculus with   insertion of left ureteral stent.      ANESTHESIA:  General.      ANESTHESIOLOGIST:  Jered Mendiola MD and Edson Bear MD    SURGEON:  Umair Carolina MD     BRIEF HISTORY:  This 69-year-old male presented to the office approximately a   month ago with intermittent left colic and gross hematuria.  Cystoscopy just   showed an enlarged prostate.  A CT scan showed about a 4x7 mm left ureteral   calculus.  New KUB done yesterday did show a 4x9 mm calculus in the distal   left ureter.  He now presents for cystoscopy, ureteroscopy, lasertripsy of   this calculus with possible stent insertion.      REPORT OF OPERATION:  Under general anesthesia with the patient in the   lithotomy position, the genitalia were prepped and draped in a sterile manner.    The 22-Bahraini cystoscope was introduced into the bladder.  Anterior urethra   was normal.  Prostatic urethra did show trilobar hypertrophy of the prostate   with a fairly prominent median lobe.  It was very difficult to identify the   left ureteral orifice, as was just below the bladder neck area.  I did get the   70-degree lens and thought I could see the orifice.  I then got a right angle   4-B[place]i vascular catheter to help direct the Zip wire in a more lateral   direction.  With this, I was able to engage the wire up the ureter.  The wire   was passed up to the kidney at this point.  This did move this left ureter   more lateral to the median lobe.  At this point, the bladder was drained and   cystoscope removed.  I then used a [place] dilator passed over the wire   into the lower to  mid ureter to dilate the ureteral orifice.  I let this sit   for about 4-5 minutes and then took the semirigid ureteroscope and attempted   to get it alongside the wire.  Visibility was quite poor and the angle was   poor and so I ended up having to pass the scope over the wire and with this, I   was able to get up to the stone.  It was very dark stone.  The Zip wire was   removed and utilizing a 200 micron fiber, initial power settings were 30 Hz   and 0.2 joules.  This really did minimal damage to destroy the stone.  Power   was eventually turned up to 0.4 joules and finally to 0.6 joules.  With this,   I was able to fragment the stone completely so that all fragments were less   than a millimeter in greatest diameter.  At this point, it was felt the stone   was adequately treated, no other abnormalities were noted.  The Zip wire was   reintroduced through the scope up to the kidney under fluoroscopic guidance.    The ureteroscope was removed.  The cystoscope was backloaded onto the wire and   passed into the bladder and at this point, because of the location of this   ureteral orifice and the fact I had to dilate the lower ureter, I elected to   place the stent.  At this point, I passed a 6-Welsh x 28 cm long double   pigtail stent over the wire up to the kidney.  Upon removal of the wire, there   was good curl with stent in the kidney and bladder.  I did leave a long   suture on the distal end for easy extraction in the office at a later date.    At this point, there was a miniscule debris, but it was all just dust and   would not worth collecting.  The only other note is that upon placement of the   wire, a brownish debris did drain promptly from the kidney, but once this was   out, visibility was really quite good.  At this point, the bladder was   drained, the cystoscope removed and the patient transferred to the PACU in   stable condition.       ____________________________________     WENDY PRIETO  MD JEWELL HAGEN / ARTHUR    DD:  04/06/2020 10:53:53  DT:  04/06/2020 12:23:35    D#:  [id number]  Job#:  [id number]

## 2020-04-06 NOTE — OR NURSING
Assumed care of patient at approx 1150.  Patient alert and oriented x 4. See flowsheets for VS.  Pain is rated 1/10.     Call light and personal belongings within reach. Laura in lowest position. Monitor alarms set appropriately.

## 2020-04-06 NOTE — ANESTHESIA POSTPROCEDURE EVALUATION
Patient: Harrison Gan    Procedure Summary     Date:  04/06/20 Room / Location:  [place] [address] / SURGERY [facility]    Anesthesia Start:  0927 Anesthesia Stop:  1051    Procedures:       CYSTOSCOPY, WITH URETERAL STENT INSERTION (Left Ureter)      URETEROSCOPY (Left Ureter)      LITHOTRIPSY, USING LASER (Left Ureter) Diagnosis:  (URETERIC STONE (TIER 3A CTABD AND PELVIS 4.5 MM WIDE BY 8 MM LONG OBSTRUCTING STONE IN DISTAL LEFT URETER WITH MILD HYDRONEPHROSIS)    Surgeon:  Umair Carolina M.D. Responsible Provider:  Jered Mendiola M.D.    Anesthesia Type:  general ASA Status:  3          Final Anesthesia Type: general  Last vitals  BP   Blood Pressure : 128/48    Temp   36.7 °C (98.1 °F)    Pulse   Pulse: 71   Resp   12    SpO2   95 %      Anesthesia Post Evaluation    Patient location during evaluation: PACU  Patient participation: complete - patient participated  Level of consciousness: awake and alert  Pain score: 1    Airway patency: patent  Anesthetic complications: no  Cardiovascular status: adequate  Respiratory status: acceptable  Hydration status: acceptable    PONV: none           Nurse Pain Score: 1 (NPRS)

## 2020-04-06 NOTE — OR NURSING
Pt void x 2.     Discharge information reviewed with patient and responsible adult. No questions or concerns at this time.     IV discontinued.     See vital sign flowsheets  for discharge details.

## 2020-04-06 NOTE — ANESTHESIA PREPROCEDURE EVALUATION
PMH: Asthma,m hypertension, mobesity (BMI >35)  Relevant Problems   No relevant active problems       Physical Exam    Airway   Mallampati: II  TM distance: >3 FB  Neck ROM: full       Cardiovascular - normal exam  Rhythm: regular  Rate: normal  (-) murmur     Dental - normal exam         Pulmonary - normal exam  Breath sounds clear to auscultation     Abdominal   (+) obese     Neurological - normal exam                 Anesthesia Plan    ASA 3   ASA physical status 3 criteria: morbid obesity - BMI greater than or equal to 40    Plan - general       Airway plan will be LMA      Plan Factors:   Patient was previously instructed to abstain from smoking on day of procedure.  Patient did not smoke on day of procedure.      Induction: intravenous      Pertinent diagnostic labs and testing reviewed    Informed Consent:    Anesthetic plan and risks discussed with patient and spouse.    Use of blood products discussed with: whom consented to blood products.

## 2020-04-06 NOTE — OR SURGEON
Immediate Post OP Note    PreOp Diagnosis: Left lower ureteral calculus; left hydronephrosis    PostOp Diagnosis: same    Procedure(s):  CYSTOSCOPY, WITH RIGHT URETERAL STENT INSERTION - Wound Class: Clean Contaminated  URETEROSCOPY - Wound Class: Clean Contaminated  LITHOTRIPSY, USING LASER - Wound Class: Clean Contaminated    Surgeon(s):  Umair Carolina M.D.    Anesthesiologist/Type of Anesthesia:  Anesthesiologist: Jered Mendiola M.D.; Edson Bear M.D./General    Surgical Staff:  Circulator: Leonel Andrade R.N.  Laser Staff: Ambrosio Kerns Danger  Relief Circulator: Krystina Hwang R.N.  Scrub Person: Jordi Rogers    Specimens removed if any:  * No specimens in log *    Estimated Blood Loss: none    Findings: 4x8 mm distal Left ureteral calculus    Complications: stable to PACU         4/6/2020 10:44 AM Umair Carolina M.D.

## 2020-06-30 NOTE — PROGRESS NOTES
Chief Complaint   Patient presents with   • Results     PFT results.       HPI: This patient is a 66 y.o. Male who returns for annual follow-up of reactive airways dysfunction syndrome, following occupational sulfur dioxide exposures in 2004. He uses Singulair 10 mg every morning, with albuterol when necessary. He had previously been on Qvar 80 µg which she stopped over a year ago. He had tried Asmanex, Advair, and Breo inhalers which significantly aggravated his throat. He rarely requires his MECHELLE. He denies asthma exacerbations over the past year. He has been feeling short of breath when he gets up in the morning, accompanied by lightheadedness, as well as when he goes for his morning walk. Symptoms resolved spontaneously. Denies wheezing, chest tightness. Spirometry shows normal FEV1 at 3.13 L or 82%, FEV1/FVC of 82%. He is a lifelong nonsmoker.  He has a history of FOREST diagnosed remotely, with intolerance of CPAP. He subsequently underwent Niesen fundoplication for GERD which he states resolved his snoring. He is unaware of apneas. His BMI is 36.      Past Medical History   Diagnosis Date   • Pain      low back     • ASTHMA    • Backpain      June 2008   • Cataract    • Hypertension    • GERD (gastroesophageal reflux disease)    • FOREST (obstructive sleep apnea)    • Asthma        Social History     Social History   • Marital Status:      Spouse Name: N/A   • Number of Children: N/A   • Years of Education: N/A     Occupational History   • Not on file.     Social History Main Topics   • Smoking status: Never Smoker    • Smokeless tobacco: Not on file   • Alcohol Use: No   • Drug Use: No   • Sexual Activity: Not on file     Other Topics Concern   • Not on file     Social History Narrative       History reviewed. No pertinent family history.    Current Outpatient Prescriptions on File Prior to Visit   Medication Sig Dispense Refill   • cetirizine (ZYRTEC) 10 MG TABS Take 10 mg by mouth every day.     •  "montelukast (SINGULAIR) 10 MG TABS Take 10 mg by mouth every day.     • cyclobenzaprine (FLEXERIL) 10 MG TABS Take 10 mg by mouth 3 times a day as needed.     • levalbuterol (XOPENEX HFA) 45 MCG/ACT inhaler Inhale 1-2 Puffs by mouth every four hours as needed.     • Cetirizine HCl (ZYRTEC ALLERGY) 10 MG Cap Take  by mouth.     • beclomethasone (QVAR) 80 MCG/ACT inhaler Inhale 1 Puff by mouth 2 times a day.     • esomeprazole (NEXIUM) 40 MG delayed-release capsule Take 40 mg by mouth every morning before breakfast.     • oxycodone-acetaminophen (PERCOCET) 5-325 MG TABS Take 1-2 Tabs by mouth every four hours as needed.     • rosuvastatin (CRESTOR) 20 MG TABS Take 20 mg by mouth every evening.     • sennosides-docusate sodium (SENOKOT-S) 8.6-50 MG tablet Take 2 Tabs by mouth every day.     • Mometasone Furoate (ASMANEX 30 METERED DOSES) 110 MCG/INH AEPB Inhale  by mouth.       No current facility-administered medications on file prior to visit.       Allergies: Demerol and Lisinopril    ROS:   Constitutional: Denies fevers, chills, night sweats, fatigue or weight loss  Eyes: Denies vision loss, pain, drainage, double vision  Ears, Nose, Throat: Denies earache, difficulty hearing, tinnitus, nasal congestion, hoarseness  Cardiovascular: Denies chest pain, tightness, palpitations, orthopnea or edema  Respiratory: As in history of present illness  Sleep: +daytime sleepiness, denies snoring, apneas, insomnia, morning headaches  GI: Denies heartburn, dysphagia, nausea, abdominal pain, diarrhea or constipation  : Denies frequent urination, hematuria, discharge or painful urination  Musculoskeletal: Denies back pain, painful joints, sore muscles  Neurological: Denies weakness or headaches  Skin: No rashes    Blood pressure 120/74, pulse 80, temperature 36.6 °C (97.9 °F), temperature source Temporal, resp. rate 18, height 1.854 m (6' 0.99\"), weight 125.193 kg (276 lb).  Multi-Ox Readings  Multi Ox #1     O2 sat % at rest   "   O2 sat % on exertion     O2 sat average on exertion     Multi Ox #2     O2 sat % at rest     O2 sat % on exertion     O2 sat average on exertion       Oxygen Use     Oxygen Frequency     Duration of need     Is the patient mobile within the home?     CPAP Use?     BIPAP Use?     Servo Titration         Physical Exam:  Appearance: Well-nourished, well-developed, in no acute distress  HEENT: Normocephalic, atraumatic, white sclera, PERRLA, oropharynx clear, Mallampati 3  Neck: No adenopathy or masses  Respiratory: no intercostal retractions or accessory muscle use  Lungs auscultation: Clear to auscultation bilaterally  Cardiovascular: Regular rate rhythm. No murmurs, rubs or gallops.  No LE edema  Abdomen: soft, nondistended  Gait: Normal  Digits: No clubbing, cyanosis  Motor: No focal deficits  Orientation: Oriented to time, person and place    Diagnosis:  1. Mild persistent asthma without complication     2. FOREST (obstructive sleep apnea)  OVERNIGHT HOME SLEEP STUDY   3. Seasonal allergic rhinitis due to pollen         Plan:  The patient has occupational asthma, which has been stable off inhaled corticosteroids.  Recommend switching Singulair 10 mg to daily at bedtime dosing.  Continue Xopenex HFA 1-2 puffs every 4 hours when necessary, and 15 minutes prophylactically before exercise.   Screening overnight home polysomnography recommended for reassessment of FOREST. He has BMI of 36 and crowded airway with high risk for FOREST.  Return for after testing, follow up visit with Sara Perez MD.         No bruits; no thyromegaly or nodules

## 2020-12-01 ENCOUNTER — HOSPITAL ENCOUNTER (INPATIENT)
Dept: HOSPITAL 8 - ED | Age: 70
LOS: 6 days | Discharge: HOME | DRG: 872 | End: 2020-12-07
Attending: STUDENT IN AN ORGANIZED HEALTH CARE EDUCATION/TRAINING PROGRAM | Admitting: STUDENT IN AN ORGANIZED HEALTH CARE EDUCATION/TRAINING PROGRAM
Payer: COMMERCIAL

## 2020-12-01 ENCOUNTER — HOSPITAL ENCOUNTER (OUTPATIENT)
Facility: MEDICAL CENTER | Age: 70
End: 2020-12-01
Payer: COMMERCIAL

## 2020-12-01 VITALS — BODY MASS INDEX: 32.76 KG/M2 | HEIGHT: 74 IN | WEIGHT: 255.3 LBS

## 2020-12-01 DIAGNOSIS — E78.5: ICD-10-CM
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
## 2020-12-01 DIAGNOSIS — E11.65: ICD-10-CM

## 2020-12-01 DIAGNOSIS — N20.0: ICD-10-CM

## 2020-12-01 DIAGNOSIS — N39.0: ICD-10-CM

## 2020-12-01 DIAGNOSIS — T39.395A: ICD-10-CM

## 2020-12-01 DIAGNOSIS — Z88.8: ICD-10-CM

## 2020-12-01 DIAGNOSIS — I12.9: ICD-10-CM

## 2020-12-01 DIAGNOSIS — Z82.49: ICD-10-CM

## 2020-12-01 DIAGNOSIS — B95.8: ICD-10-CM

## 2020-12-01 DIAGNOSIS — K21.9: ICD-10-CM

## 2020-12-01 DIAGNOSIS — Z87.442: ICD-10-CM

## 2020-12-01 DIAGNOSIS — G89.29: ICD-10-CM

## 2020-12-01 DIAGNOSIS — A41.9: Primary | ICD-10-CM

## 2020-12-01 DIAGNOSIS — Z20.828: ICD-10-CM

## 2020-12-01 DIAGNOSIS — N18.9: ICD-10-CM

## 2020-12-01 DIAGNOSIS — M48.061: ICD-10-CM

## 2020-12-01 DIAGNOSIS — N17.9: ICD-10-CM

## 2020-12-01 DIAGNOSIS — J45.909: ICD-10-CM

## 2020-12-01 DIAGNOSIS — E11.22: ICD-10-CM

## 2020-12-01 DIAGNOSIS — M47.816: ICD-10-CM

## 2020-12-01 LAB
ALBUMIN SERPL-MCNC: 3.5 G/DL (ref 3.4–5)
ALP SERPL-CCNC: 137 U/L (ref 45–117)
ALT SERPL-CCNC: 46 U/L (ref 12–78)
ANION GAP SERPL CALC-SCNC: 10 MMOL/L (ref 5–15)
BASOPHILS # BLD AUTO: 0.1 X10^3/UL (ref 0–0.1)
BASOPHILS NFR BLD AUTO: 1 % (ref 0–1)
BILIRUB SERPL-MCNC: 1.1 MG/DL (ref 0.2–1)
CALCIUM SERPL-MCNC: 10.1 MG/DL (ref 8.5–10.1)
CHLORIDE SERPL-SCNC: 107 MMOL/L (ref 98–107)
CREAT SERPL-MCNC: 2.66 MG/DL (ref 0.7–1.3)
EOSINOPHIL # BLD AUTO: 0 X10^3/UL (ref 0–0.4)
EOSINOPHIL NFR BLD AUTO: 0 % (ref 1–7)
ERYTHROCYTE [DISTWIDTH] IN BLOOD BY AUTOMATED COUNT: 13.8 % (ref 9.4–14.8)
ERYTHROCYTE [SEDIMENTATION RATE] IN BLOOD BY WESTERGREN METHOD: 87 MM/HR (ref 0–10)
EST. AVERAGE GLUCOSE BLD GHB EST-MCNC: 146 MG/DL (ref 0–126)
HCT (SEDRATE): 42 % (ref 39.2–51.8)
LYMPHOCYTES # BLD AUTO: 1.6 X10^3/UL (ref 1–3.4)
LYMPHOCYTES NFR BLD AUTO: 9 % (ref 22–44)
MCH RBC QN AUTO: 29.1 PG (ref 27.5–34.5)
MCHC RBC AUTO-ENTMCNC: 33.4 G/DL (ref 33.2–36.2)
MD: (no result)
MICROSCOPIC: (no result)
MONOCYTES # BLD AUTO: 1.3 X10^3/UL (ref 0.2–0.8)
MONOCYTES NFR BLD AUTO: 7 % (ref 2–9)
NEUTROPHILS # BLD AUTO: 14.5 X10^3/UL (ref 1.8–6.8)
NEUTROPHILS NFR BLD AUTO: 83 % (ref 42–75)
PLATELET # BLD AUTO: 581 X10^3/UL (ref 130–400)
PMV BLD AUTO: 7.4 FL (ref 7.4–10.4)
PROT SERPL-MCNC: 8.9 G/DL (ref 6.4–8.2)
RBC # BLD AUTO: 4.82 X10^6/UL (ref 4.38–5.82)

## 2020-12-01 PROCEDURE — 96365 THER/PROPH/DIAG IV INF INIT: CPT

## 2020-12-01 PROCEDURE — 85025 COMPLETE CBC W/AUTO DIFF WBC: CPT

## 2020-12-01 PROCEDURE — 86140 C-REACTIVE PROTEIN: CPT

## 2020-12-01 PROCEDURE — U0003 INFECTIOUS AGENT DETECTION BY NUCLEIC ACID (DNA OR RNA); SEVERE ACUTE RESPIRATORY SYNDROME CORONAVIRUS 2 (SARS-COV-2) (CORONAVIRUS DISEASE [COVID-19]), AMPLIFIED PROBE TECHNIQUE, MAKING USE OF HIGH THROUGHPUT TECHNOLOGIES AS DESCRIBED BY CMS-2020-01-R: HCPCS

## 2020-12-01 PROCEDURE — 85651 RBC SED RATE NONAUTOMATED: CPT

## 2020-12-01 PROCEDURE — 76770 US EXAM ABDO BACK WALL COMP: CPT

## 2020-12-01 PROCEDURE — 80048 BASIC METABOLIC PNL TOTAL CA: CPT

## 2020-12-01 PROCEDURE — 80053 COMPREHEN METABOLIC PANEL: CPT

## 2020-12-01 PROCEDURE — 84145 PROCALCITONIN (PCT): CPT

## 2020-12-01 PROCEDURE — 84443 ASSAY THYROID STIM HORMONE: CPT

## 2020-12-01 PROCEDURE — 84439 ASSAY OF FREE THYROXINE: CPT

## 2020-12-01 PROCEDURE — 82962 GLUCOSE BLOOD TEST: CPT

## 2020-12-01 PROCEDURE — 81001 URINALYSIS AUTO W/SCOPE: CPT

## 2020-12-01 PROCEDURE — 74018 RADEX ABDOMEN 1 VIEW: CPT

## 2020-12-01 PROCEDURE — 93306 TTE W/DOPPLER COMPLETE: CPT

## 2020-12-01 PROCEDURE — 82550 ASSAY OF CK (CPK): CPT

## 2020-12-01 PROCEDURE — 96375 TX/PRO/DX INJ NEW DRUG ADDON: CPT

## 2020-12-01 PROCEDURE — 83036 HEMOGLOBIN GLYCOSYLATED A1C: CPT

## 2020-12-01 PROCEDURE — 87077 CULTURE AEROBIC IDENTIFY: CPT

## 2020-12-01 PROCEDURE — 83605 ASSAY OF LACTIC ACID: CPT

## 2020-12-01 PROCEDURE — 72148 MRI LUMBAR SPINE W/O DYE: CPT

## 2020-12-01 PROCEDURE — 99285 EMERGENCY DEPT VISIT HI MDM: CPT

## 2020-12-01 PROCEDURE — 80202 ASSAY OF VANCOMYCIN: CPT

## 2020-12-01 PROCEDURE — 87040 BLOOD CULTURE FOR BACTERIA: CPT

## 2020-12-01 PROCEDURE — 87186 SC STD MICRODIL/AGAR DIL: CPT

## 2020-12-01 PROCEDURE — 87086 URINE CULTURE/COLONY COUNT: CPT

## 2020-12-01 PROCEDURE — 36415 COLL VENOUS BLD VENIPUNCTURE: CPT

## 2020-12-01 RX ADMIN — DEXAMETHASONE SCH MG: 4 TABLET ORAL at 21:32

## 2020-12-01 RX ADMIN — SODIUM CHLORIDE SCH MLS/HR: 0.9 INJECTION, SOLUTION INTRAVENOUS at 22:47

## 2020-12-01 RX ADMIN — CEFTRIAXONE SCH MLS/HR: 1 INJECTION, SOLUTION INTRAVENOUS at 19:00

## 2020-12-01 RX ADMIN — MORPHINE SULFATE PRN MG: 10 INJECTION INTRAVENOUS at 21:33

## 2020-12-01 RX ADMIN — ENOXAPARIN SODIUM SCH MG: 40 INJECTION SUBCUTANEOUS at 21:32

## 2020-12-01 NOTE — NUR
pt has been medicated per order.



pt advised not to drive after valium.  pt verbalized uderstanding.  pt wife at 
bedside to drive him home



pt reports immediate relief after valium

## 2020-12-01 NOTE — NUR
assumed care of pt.  pt here for c/o R low back pain x3 weeks.  pt reports that 
he thought that he was having kidney stones because he has a hx of same with 
lithotripsy



pt reports that he was seen at his urology office PTA and was given toradol 
that helped his pain



pt is very histrionic and repeatedly returning to [facility] surgeries that he 
has had previously with Dr. Hugh DUNLAP at bedside for eval

## 2020-12-02 VITALS — SYSTOLIC BLOOD PRESSURE: 176 MMHG | DIASTOLIC BLOOD PRESSURE: 84 MMHG

## 2020-12-02 VITALS — SYSTOLIC BLOOD PRESSURE: 175 MMHG | DIASTOLIC BLOOD PRESSURE: 110 MMHG

## 2020-12-02 VITALS — DIASTOLIC BLOOD PRESSURE: 82 MMHG | SYSTOLIC BLOOD PRESSURE: 148 MMHG

## 2020-12-02 VITALS — DIASTOLIC BLOOD PRESSURE: 89 MMHG | SYSTOLIC BLOOD PRESSURE: 156 MMHG

## 2020-12-02 VITALS — DIASTOLIC BLOOD PRESSURE: 91 MMHG | SYSTOLIC BLOOD PRESSURE: 160 MMHG

## 2020-12-02 LAB
ALBUMIN SERPL-MCNC: 3.2 G/DL (ref 3.4–5)
ALP SERPL-CCNC: 118 U/L (ref 45–117)
ALT SERPL-CCNC: 41 U/L (ref 12–78)
ANION GAP SERPL CALC-SCNC: 7 MMOL/L (ref 5–15)
BASOPHILS # BLD AUTO: 0.1 X10^3/UL (ref 0–0.1)
BASOPHILS NFR BLD AUTO: 1 % (ref 0–1)
BILIRUB SERPL-MCNC: 0.9 MG/DL (ref 0.2–1)
CALCIUM SERPL-MCNC: 9.7 MG/DL (ref 8.5–10.1)
CHLORIDE SERPL-SCNC: 107 MMOL/L (ref 98–107)
COVID ORDER STATUS COVID19: NORMAL
CREAT SERPL-MCNC: 2.27 MG/DL (ref 0.7–1.3)
EOSINOPHIL # BLD AUTO: 0.1 X10^3/UL (ref 0–0.4)
EOSINOPHIL NFR BLD AUTO: 1 % (ref 1–7)
ERYTHROCYTE [DISTWIDTH] IN BLOOD BY AUTOMATED COUNT: 13.9 % (ref 9.4–14.8)
LYMPHOCYTES # BLD AUTO: 1.3 X10^3/UL (ref 1–3.4)
LYMPHOCYTES NFR BLD AUTO: 10 % (ref 22–44)
MCH RBC QN AUTO: 29.5 PG (ref 27.5–34.5)
MCHC RBC AUTO-ENTMCNC: 33.5 G/DL (ref 33.2–36.2)
MD: NO
MONOCYTES # BLD AUTO: 0.6 X10^3/UL (ref 0.2–0.8)
MONOCYTES NFR BLD AUTO: 5 % (ref 2–9)
NEUTROPHILS # BLD AUTO: 11.1 X10^3/UL (ref 1.8–6.8)
NEUTROPHILS NFR BLD AUTO: 84 % (ref 42–75)
PLATELET # BLD AUTO: 487 X10^3/UL (ref 130–400)
PMV BLD AUTO: 7 FL (ref 7.4–10.4)
PROT SERPL-MCNC: 8.1 G/DL (ref 6.4–8.2)
RBC # BLD AUTO: 4.32 X10^6/UL (ref 4.38–5.82)
SARS-COV-2 RNA RESP QL NAA+PROBE: NOTDETECTED
SPECIMEN SOURCE: NORMAL

## 2020-12-02 RX ADMIN — DEXAMETHASONE SCH MG: 4 TABLET ORAL at 18:26

## 2020-12-02 RX ADMIN — CEFTRIAXONE SCH MLS/HR: 1 INJECTION, SOLUTION INTRAVENOUS at 18:26

## 2020-12-02 RX ADMIN — DEXAMETHASONE SCH MG: 4 TABLET ORAL at 00:34

## 2020-12-02 RX ADMIN — SODIUM CHLORIDE SCH MLS/HR: 0.9 INJECTION, SOLUTION INTRAVENOUS at 07:50

## 2020-12-02 RX ADMIN — ENOXAPARIN SODIUM SCH MG: 40 INJECTION SUBCUTANEOUS at 18:26

## 2020-12-02 RX ADMIN — DEXAMETHASONE SCH MG: 4 TABLET ORAL at 06:34

## 2020-12-02 RX ADMIN — OXYCODONE HYDROCHLORIDE PRN MG: 5 TABLET ORAL at 01:16

## 2020-12-02 RX ADMIN — METHOCARBAMOL PRN MG: 500 TABLET ORAL at 18:26

## 2020-12-02 RX ADMIN — OXYCODONE HYDROCHLORIDE PRN MG: 5 TABLET ORAL at 18:26

## 2020-12-02 RX ADMIN — MORPHINE SULFATE PRN MG: 10 INJECTION INTRAVENOUS at 00:34

## 2020-12-02 RX ADMIN — DEXAMETHASONE SCH MG: 4 TABLET ORAL at 11:17

## 2020-12-02 RX ADMIN — MORPHINE SULFATE PRN MG: 10 INJECTION INTRAVENOUS at 11:17

## 2020-12-02 RX ADMIN — METHOCARBAMOL PRN MG: 500 TABLET ORAL at 00:34

## 2020-12-02 RX ADMIN — OXYCODONE HYDROCHLORIDE PRN MG: 5 TABLET ORAL at 06:34

## 2020-12-03 VITALS — SYSTOLIC BLOOD PRESSURE: 147 MMHG | DIASTOLIC BLOOD PRESSURE: 77 MMHG

## 2020-12-03 VITALS — DIASTOLIC BLOOD PRESSURE: 75 MMHG | SYSTOLIC BLOOD PRESSURE: 154 MMHG

## 2020-12-03 VITALS — SYSTOLIC BLOOD PRESSURE: 152 MMHG | DIASTOLIC BLOOD PRESSURE: 82 MMHG

## 2020-12-03 VITALS — SYSTOLIC BLOOD PRESSURE: 152 MMHG | DIASTOLIC BLOOD PRESSURE: 78 MMHG

## 2020-12-03 LAB
ANION GAP SERPL CALC-SCNC: 8 MMOL/L (ref 5–15)
BASOPHILS # BLD AUTO: 0 X10^3/UL (ref 0–0.1)
BASOPHILS NFR BLD AUTO: 0 % (ref 0–1)
CALCIUM SERPL-MCNC: 9.7 MG/DL (ref 8.5–10.1)
CHLORIDE SERPL-SCNC: 110 MMOL/L (ref 98–107)
CK SERPL-CCNC: 62 U/L (ref 39–308)
CREAT SERPL-MCNC: 1.9 MG/DL (ref 0.7–1.3)
CRP SERPL-MCNC: 1.84 MG/DL (ref 0.02–0.49)
EOSINOPHIL # BLD AUTO: 0 X10^3/UL (ref 0–0.4)
EOSINOPHIL NFR BLD AUTO: 0 % (ref 1–7)
ERYTHROCYTE [DISTWIDTH] IN BLOOD BY AUTOMATED COUNT: 13.7 % (ref 9.4–14.8)
LYMPHOCYTES # BLD AUTO: 1.2 X10^3/UL (ref 1–3.4)
LYMPHOCYTES NFR BLD AUTO: 8 % (ref 22–44)
MCH RBC QN AUTO: 29.1 PG (ref 27.5–34.5)
MCHC RBC AUTO-ENTMCNC: 33.3 G/DL (ref 33.2–36.2)
MD: NO
MONOCYTES # BLD AUTO: 0.4 X10^3/UL (ref 0.2–0.8)
MONOCYTES NFR BLD AUTO: 3 % (ref 2–9)
NEUTROPHILS # BLD AUTO: 13.2 X10^3/UL (ref 1.8–6.8)
NEUTROPHILS NFR BLD AUTO: 89 % (ref 42–75)
PLATELET # BLD AUTO: 572 X10^3/UL (ref 130–400)
PMV BLD AUTO: 7.4 FL (ref 7.4–10.4)
RBC # BLD AUTO: 4.39 X10^6/UL (ref 4.38–5.82)
VANCOMYCIN,RANDOM: 14.4 MCG/ML

## 2020-12-03 RX ADMIN — NEBIVOLOL HYDROCHLORIDE SCH MG: 5 TABLET ORAL at 09:21

## 2020-12-03 RX ADMIN — SODIUM CHLORIDE SCH MLS/HR: 0.9 INJECTION, SOLUTION INTRAVENOUS at 16:30

## 2020-12-03 RX ADMIN — METHOCARBAMOL PRN MG: 500 TABLET ORAL at 21:37

## 2020-12-03 RX ADMIN — FINASTERIDE SCH MG: 5 TABLET, FILM COATED ORAL at 09:21

## 2020-12-03 RX ADMIN — OXYCODONE HYDROCHLORIDE PRN MG: 5 TABLET ORAL at 17:18

## 2020-12-03 RX ADMIN — OXYCODONE HYDROCHLORIDE PRN MG: 5 TABLET ORAL at 21:36

## 2020-12-03 RX ADMIN — INSULIN LISPRO SCH UNITS: 100 INJECTION, SOLUTION INTRAVENOUS; SUBCUTANEOUS at 20:57

## 2020-12-03 RX ADMIN — TAMSULOSIN HYDROCHLORIDE SCH MG: 0.4 CAPSULE ORAL at 09:20

## 2020-12-03 RX ADMIN — METHOCARBAMOL PRN MG: 500 TABLET ORAL at 05:45

## 2020-12-03 RX ADMIN — METHOCARBAMOL PRN MG: 500 TABLET ORAL at 13:38

## 2020-12-03 RX ADMIN — CEFTRIAXONE SCH MLS/HR: 1 INJECTION, SOLUTION INTRAVENOUS at 17:18

## 2020-12-03 RX ADMIN — DEXAMETHASONE SCH MG: 4 TABLET ORAL at 06:28

## 2020-12-03 RX ADMIN — BUDESONIDE AND FORMOTEROL FUMARATE DIHYDRATE SCH MG: 160; 4.5 AEROSOL RESPIRATORY (INHALATION) at 09:00

## 2020-12-03 RX ADMIN — CETIRIZINE HYDROCHLORIDE SCH MG: 10 TABLET, FILM COATED ORAL at 09:20

## 2020-12-03 RX ADMIN — MONTELUKAST SODIUM SCH MG: 10 TABLET ORAL at 20:13

## 2020-12-03 RX ADMIN — ENOXAPARIN SODIUM SCH MG: 40 INJECTION SUBCUTANEOUS at 17:18

## 2020-12-03 RX ADMIN — OXYCODONE HYDROCHLORIDE PRN MG: 5 TABLET ORAL at 09:59

## 2020-12-03 RX ADMIN — OXYCODONE HYDROCHLORIDE PRN MG: 5 TABLET ORAL at 05:45

## 2020-12-03 RX ADMIN — DEXAMETHASONE SCH MG: 4 TABLET ORAL at 01:02

## 2020-12-03 RX ADMIN — DEXAMETHASONE SCH MG: 4 TABLET ORAL at 13:38

## 2020-12-03 RX ADMIN — SODIUM CHLORIDE SCH MLS/HR: 0.9 INJECTION, SOLUTION INTRAVENOUS at 01:02

## 2020-12-04 VITALS — DIASTOLIC BLOOD PRESSURE: 77 MMHG | SYSTOLIC BLOOD PRESSURE: 148 MMHG

## 2020-12-04 VITALS — DIASTOLIC BLOOD PRESSURE: 84 MMHG | SYSTOLIC BLOOD PRESSURE: 166 MMHG

## 2020-12-04 VITALS — DIASTOLIC BLOOD PRESSURE: 73 MMHG | SYSTOLIC BLOOD PRESSURE: 162 MMHG

## 2020-12-04 VITALS — DIASTOLIC BLOOD PRESSURE: 79 MMHG | SYSTOLIC BLOOD PRESSURE: 163 MMHG

## 2020-12-04 LAB
ANION GAP SERPL CALC-SCNC: 9 MMOL/L (ref 5–15)
BASOPHILS # BLD AUTO: 0.1 X10^3/UL (ref 0–0.1)
BASOPHILS NFR BLD AUTO: 0 % (ref 0–1)
CALCIUM SERPL-MCNC: 9.3 MG/DL (ref 8.5–10.1)
CHLORIDE SERPL-SCNC: 110 MMOL/L (ref 98–107)
CREAT SERPL-MCNC: 1.66 MG/DL (ref 0.7–1.3)
EOSINOPHIL # BLD AUTO: 0 X10^3/UL (ref 0–0.4)
EOSINOPHIL NFR BLD AUTO: 0 % (ref 1–7)
ERYTHROCYTE [DISTWIDTH] IN BLOOD BY AUTOMATED COUNT: 13.7 % (ref 9.4–14.8)
LYMPHOCYTES # BLD AUTO: 1.7 X10^3/UL (ref 1–3.4)
LYMPHOCYTES NFR BLD AUTO: 10 % (ref 22–44)
MCH RBC QN AUTO: 28.8 PG (ref 27.5–34.5)
MCHC RBC AUTO-ENTMCNC: 32.9 G/DL (ref 33.2–36.2)
MD: NO
MONOCYTES # BLD AUTO: 1 X10^3/UL (ref 0.2–0.8)
MONOCYTES NFR BLD AUTO: 6 % (ref 2–9)
NEUTROPHILS # BLD AUTO: 14.1 X10^3/UL (ref 1.8–6.8)
NEUTROPHILS NFR BLD AUTO: 84 % (ref 42–75)
PLATELET # BLD AUTO: 494 X10^3/UL (ref 130–400)
PMV BLD AUTO: 7.4 FL (ref 7.4–10.4)
RBC # BLD AUTO: 4 X10^6/UL (ref 4.38–5.82)
VANCOMYCIN,RANDOM: 19.3 MCG/ML

## 2020-12-04 RX ADMIN — MORPHINE SULFATE PRN MG: 10 INJECTION INTRAVENOUS at 21:55

## 2020-12-04 RX ADMIN — INSULIN LISPRO SCH UNITS: 100 INJECTION, SOLUTION INTRAVENOUS; SUBCUTANEOUS at 07:00

## 2020-12-04 RX ADMIN — OXYCODONE HYDROCHLORIDE PRN MG: 5 TABLET ORAL at 17:32

## 2020-12-04 RX ADMIN — OXYCODONE HYDROCHLORIDE PRN MG: 5 TABLET ORAL at 06:00

## 2020-12-04 RX ADMIN — LISINOPRIL SCH MG: 5 TABLET ORAL at 11:25

## 2020-12-04 RX ADMIN — METHOCARBAMOL PRN MG: 500 TABLET ORAL at 06:00

## 2020-12-04 RX ADMIN — NEBIVOLOL HYDROCHLORIDE SCH MG: 5 TABLET ORAL at 07:50

## 2020-12-04 RX ADMIN — CETIRIZINE HYDROCHLORIDE SCH MG: 10 TABLET, FILM COATED ORAL at 07:50

## 2020-12-04 RX ADMIN — ENOXAPARIN SODIUM SCH MG: 40 INJECTION SUBCUTANEOUS at 17:34

## 2020-12-04 RX ADMIN — SODIUM CHLORIDE SCH MLS/HR: 0.9 INJECTION, SOLUTION INTRAVENOUS at 06:00

## 2020-12-04 RX ADMIN — MONTELUKAST SODIUM SCH MG: 10 TABLET ORAL at 21:54

## 2020-12-04 RX ADMIN — TAMSULOSIN HYDROCHLORIDE SCH MG: 0.4 CAPSULE ORAL at 07:50

## 2020-12-04 RX ADMIN — FINASTERIDE SCH MG: 5 TABLET, FILM COATED ORAL at 07:50

## 2020-12-04 RX ADMIN — METHOCARBAMOL PRN MG: 500 TABLET ORAL at 17:32

## 2020-12-04 RX ADMIN — OXYCODONE HYDROCHLORIDE PRN MG: 5 TABLET ORAL at 11:42

## 2020-12-04 RX ADMIN — MORPHINE SULFATE PRN MG: 10 INJECTION INTRAVENOUS at 18:21

## 2020-12-05 VITALS — SYSTOLIC BLOOD PRESSURE: 150 MMHG | DIASTOLIC BLOOD PRESSURE: 74 MMHG

## 2020-12-05 VITALS — SYSTOLIC BLOOD PRESSURE: 171 MMHG | DIASTOLIC BLOOD PRESSURE: 82 MMHG

## 2020-12-05 VITALS — SYSTOLIC BLOOD PRESSURE: 164 MMHG | DIASTOLIC BLOOD PRESSURE: 77 MMHG

## 2020-12-05 VITALS — SYSTOLIC BLOOD PRESSURE: 169 MMHG | DIASTOLIC BLOOD PRESSURE: 79 MMHG

## 2020-12-05 LAB
ANION GAP SERPL CALC-SCNC: 8 MMOL/L (ref 5–15)
BASOPHILS # BLD AUTO: 0.1 X10^3/UL (ref 0–0.1)
BASOPHILS NFR BLD AUTO: 1 % (ref 0–1)
CALCIUM SERPL-MCNC: 9.2 MG/DL (ref 8.5–10.1)
CHLORIDE SERPL-SCNC: 112 MMOL/L (ref 98–107)
CREAT SERPL-MCNC: 1.53 MG/DL (ref 0.7–1.3)
EOSINOPHIL # BLD AUTO: 0.6 X10^3/UL (ref 0–0.4)
EOSINOPHIL NFR BLD AUTO: 5 % (ref 1–7)
ERYTHROCYTE [DISTWIDTH] IN BLOOD BY AUTOMATED COUNT: 13.7 % (ref 9.4–14.8)
LYMPHOCYTES # BLD AUTO: 3 X10^3/UL (ref 1–3.4)
LYMPHOCYTES NFR BLD AUTO: 23 % (ref 22–44)
MCH RBC QN AUTO: 28.7 PG (ref 27.5–34.5)
MCHC RBC AUTO-ENTMCNC: 33.2 G/DL (ref 33.2–36.2)
MD: NO
MONOCYTES # BLD AUTO: 1.5 X10^3/UL (ref 0.2–0.8)
MONOCYTES NFR BLD AUTO: 12 % (ref 2–9)
NEUTROPHILS # BLD AUTO: 7.6 X10^3/UL (ref 1.8–6.8)
NEUTROPHILS NFR BLD AUTO: 59 % (ref 42–75)
PLATELET # BLD AUTO: 460 X10^3/UL (ref 130–400)
PMV BLD AUTO: 7.4 FL (ref 7.4–10.4)
RBC # BLD AUTO: 4.16 X10^6/UL (ref 4.38–5.82)

## 2020-12-05 RX ADMIN — ENOXAPARIN SODIUM SCH MG: 40 INJECTION SUBCUTANEOUS at 17:37

## 2020-12-05 RX ADMIN — FINASTERIDE SCH MG: 5 TABLET, FILM COATED ORAL at 08:14

## 2020-12-05 RX ADMIN — CETIRIZINE HYDROCHLORIDE SCH MG: 10 TABLET, FILM COATED ORAL at 08:14

## 2020-12-05 RX ADMIN — LISINOPRIL SCH MG: 5 TABLET ORAL at 08:14

## 2020-12-05 RX ADMIN — OXYCODONE HYDROCHLORIDE PRN MG: 5 TABLET ORAL at 08:15

## 2020-12-05 RX ADMIN — TAMSULOSIN HYDROCHLORIDE SCH MG: 0.4 CAPSULE ORAL at 08:14

## 2020-12-05 RX ADMIN — OXYCODONE HYDROCHLORIDE PRN MG: 5 TABLET ORAL at 12:29

## 2020-12-05 RX ADMIN — MORPHINE SULFATE PRN MG: 10 INJECTION INTRAVENOUS at 17:37

## 2020-12-05 RX ADMIN — NEBIVOLOL HYDROCHLORIDE SCH MG: 5 TABLET ORAL at 08:14

## 2020-12-05 RX ADMIN — MONTELUKAST SODIUM SCH MG: 10 TABLET ORAL at 22:21

## 2020-12-05 RX ADMIN — BUDESONIDE AND FORMOTEROL FUMARATE DIHYDRATE SCH MG: 160; 4.5 AEROSOL RESPIRATORY (INHALATION) at 08:15

## 2020-12-06 VITALS — DIASTOLIC BLOOD PRESSURE: 81 MMHG | SYSTOLIC BLOOD PRESSURE: 162 MMHG

## 2020-12-06 VITALS — SYSTOLIC BLOOD PRESSURE: 163 MMHG | DIASTOLIC BLOOD PRESSURE: 79 MMHG

## 2020-12-06 VITALS — SYSTOLIC BLOOD PRESSURE: 154 MMHG | DIASTOLIC BLOOD PRESSURE: 83 MMHG

## 2020-12-06 VITALS — SYSTOLIC BLOOD PRESSURE: 157 MMHG | DIASTOLIC BLOOD PRESSURE: 84 MMHG

## 2020-12-06 LAB
BASOPHILS # BLD AUTO: 0.1 X10^3/UL (ref 0–0.1)
BASOPHILS NFR BLD AUTO: 1 % (ref 0–1)
EOSINOPHIL # BLD AUTO: 0.6 X10^3/UL (ref 0–0.4)
EOSINOPHIL NFR BLD AUTO: 6 % (ref 1–7)
ERYTHROCYTE [DISTWIDTH] IN BLOOD BY AUTOMATED COUNT: 13.6 % (ref 9.4–14.8)
LYMPHOCYTES # BLD AUTO: 2.3 X10^3/UL (ref 1–3.4)
LYMPHOCYTES NFR BLD AUTO: 21 % (ref 22–44)
MCH RBC QN AUTO: 28.8 PG (ref 27.5–34.5)
MCHC RBC AUTO-ENTMCNC: 32.9 G/DL (ref 33.2–36.2)
MD: NO
MONOCYTES # BLD AUTO: 1.2 X10^3/UL (ref 0.2–0.8)
MONOCYTES NFR BLD AUTO: 11 % (ref 2–9)
NEUTROPHILS # BLD AUTO: 6.7 X10^3/UL (ref 1.8–6.8)
NEUTROPHILS NFR BLD AUTO: 61 % (ref 42–75)
PLATELET # BLD AUTO: 455 X10^3/UL (ref 130–400)
PMV BLD AUTO: 7.3 FL (ref 7.4–10.4)
RBC # BLD AUTO: 4.4 X10^6/UL (ref 4.38–5.82)

## 2020-12-06 RX ADMIN — FINASTERIDE SCH MG: 5 TABLET, FILM COATED ORAL at 09:58

## 2020-12-06 RX ADMIN — GABAPENTIN SCH MG: 300 CAPSULE ORAL at 11:33

## 2020-12-06 RX ADMIN — GABAPENTIN SCH MG: 300 CAPSULE ORAL at 16:33

## 2020-12-06 RX ADMIN — NEBIVOLOL HYDROCHLORIDE SCH MG: 5 TABLET ORAL at 09:58

## 2020-12-06 RX ADMIN — GABAPENTIN SCH MG: 300 CAPSULE ORAL at 21:27

## 2020-12-06 RX ADMIN — TAMSULOSIN HYDROCHLORIDE SCH MG: 0.4 CAPSULE ORAL at 09:57

## 2020-12-06 RX ADMIN — OXYCODONE HYDROCHLORIDE PRN MG: 5 TABLET ORAL at 06:00

## 2020-12-06 RX ADMIN — CETIRIZINE HYDROCHLORIDE SCH MG: 10 TABLET, FILM COATED ORAL at 09:57

## 2020-12-06 RX ADMIN — MONTELUKAST SODIUM SCH MG: 10 TABLET ORAL at 21:27

## 2020-12-06 RX ADMIN — LISINOPRIL SCH MG: 5 TABLET ORAL at 09:58

## 2020-12-06 RX ADMIN — MORPHINE SULFATE PRN MG: 10 INJECTION INTRAVENOUS at 01:26

## 2020-12-06 RX ADMIN — ENOXAPARIN SODIUM SCH MG: 40 INJECTION SUBCUTANEOUS at 18:29

## 2020-12-07 VITALS — SYSTOLIC BLOOD PRESSURE: 134 MMHG | DIASTOLIC BLOOD PRESSURE: 80 MMHG

## 2020-12-07 VITALS — DIASTOLIC BLOOD PRESSURE: 76 MMHG | SYSTOLIC BLOOD PRESSURE: 137 MMHG

## 2020-12-07 VITALS — SYSTOLIC BLOOD PRESSURE: 145 MMHG | DIASTOLIC BLOOD PRESSURE: 80 MMHG

## 2020-12-07 RX ADMIN — NEBIVOLOL HYDROCHLORIDE SCH MG: 5 TABLET ORAL at 09:15

## 2020-12-07 RX ADMIN — CETIRIZINE HYDROCHLORIDE SCH MG: 10 TABLET, FILM COATED ORAL at 09:16

## 2020-12-07 RX ADMIN — GABAPENTIN SCH MG: 300 CAPSULE ORAL at 09:15

## 2020-12-07 RX ADMIN — LISINOPRIL SCH MG: 5 TABLET ORAL at 09:15

## 2020-12-07 RX ADMIN — FINASTERIDE SCH MG: 5 TABLET, FILM COATED ORAL at 09:16

## 2020-12-07 RX ADMIN — BUDESONIDE AND FORMOTEROL FUMARATE DIHYDRATE SCH MG: 160; 4.5 AEROSOL RESPIRATORY (INHALATION) at 09:00

## 2020-12-07 RX ADMIN — TAMSULOSIN HYDROCHLORIDE SCH MG: 0.4 CAPSULE ORAL at 09:16

## 2021-01-28 ENCOUNTER — HOSPITAL ENCOUNTER (OUTPATIENT)
Dept: HOSPITAL 8 - STAR | Age: 71
Discharge: HOME | End: 2021-01-28
Attending: NEUROLOGICAL SURGERY
Payer: COMMERCIAL

## 2021-01-28 DIAGNOSIS — Z20.822: ICD-10-CM

## 2021-01-28 DIAGNOSIS — M48.061: ICD-10-CM

## 2021-01-28 DIAGNOSIS — I25.2: ICD-10-CM

## 2021-01-28 DIAGNOSIS — J98.11: ICD-10-CM

## 2021-01-28 DIAGNOSIS — Z01.818: Primary | ICD-10-CM

## 2021-01-28 LAB
ALBUMIN SERPL-MCNC: 4 G/DL (ref 3.4–5)
ALP SERPL-CCNC: 109 U/L (ref 45–117)
ALT SERPL-CCNC: 18 U/L (ref 12–78)
ANION GAP SERPL CALC-SCNC: 6 MMOL/L (ref 5–15)
APTT BLD: 27 SECONDS (ref 25–31)
BASOPHILS # BLD AUTO: 0.1 X10^3/UL (ref 0–0.1)
BASOPHILS NFR BLD AUTO: 1 % (ref 0–1)
BILIRUB SERPL-MCNC: 0.7 MG/DL (ref 0.2–1)
CALCIUM SERPL-MCNC: 10.3 MG/DL (ref 8.5–10.1)
CHLORIDE SERPL-SCNC: 103 MMOL/L (ref 98–107)
CREAT SERPL-MCNC: 1.65 MG/DL (ref 0.7–1.3)
EOSINOPHIL # BLD AUTO: 0.8 X10^3/UL (ref 0–0.4)
EOSINOPHIL NFR BLD AUTO: 5 % (ref 1–7)
ERYTHROCYTE [DISTWIDTH] IN BLOOD BY AUTOMATED COUNT: 14.2 % (ref 9.4–14.8)
INR PPP: 1 (ref 0.93–1.1)
LYMPHOCYTES # BLD AUTO: 2.6 X10^3/UL (ref 1–3.4)
LYMPHOCYTES NFR BLD AUTO: 16 % (ref 22–44)
MCH RBC QN AUTO: 27.9 PG (ref 27.5–34.5)
MCHC RBC AUTO-ENTMCNC: 33.1 G/DL (ref 33.2–36.2)
MD: NO
MONOCYTES # BLD AUTO: 1.2 X10^3/UL (ref 0.2–0.8)
MONOCYTES NFR BLD AUTO: 8 % (ref 2–9)
NEUTROPHILS # BLD AUTO: 11.8 X10^3/UL (ref 1.8–6.8)
NEUTROPHILS NFR BLD AUTO: 71 % (ref 42–75)
PLATELET # BLD AUTO: 441 X10^3/UL (ref 130–400)
PMV BLD AUTO: 7.1 FL (ref 7.4–10.4)
PROT SERPL-MCNC: 9.1 G/DL (ref 6.4–8.2)
PROTHROMBIN TIME: 10.7 SECONDS (ref 9.6–11.5)
RBC # BLD AUTO: 4.68 X10^6/UL (ref 4.38–5.82)

## 2021-01-28 PROCEDURE — 85025 COMPLETE CBC W/AUTO DIFF WBC: CPT

## 2021-01-28 PROCEDURE — 93005 ELECTROCARDIOGRAM TRACING: CPT

## 2021-01-28 PROCEDURE — 85610 PROTHROMBIN TIME: CPT

## 2021-01-28 PROCEDURE — 85730 THROMBOPLASTIN TIME PARTIAL: CPT

## 2021-01-28 PROCEDURE — 71046 X-RAY EXAM CHEST 2 VIEWS: CPT

## 2021-01-28 PROCEDURE — 80053 COMPREHEN METABOLIC PANEL: CPT

## 2021-01-28 PROCEDURE — 87635 SARS-COV-2 COVID-19 AMP PRB: CPT

## 2021-02-11 ENCOUNTER — HOSPITAL ENCOUNTER (OUTPATIENT)
Dept: RADIOLOGY | Facility: MEDICAL CENTER | Age: 71
End: 2021-02-11
Payer: MEDICARE

## 2021-02-17 ENCOUNTER — PRE-ADMISSION TESTING (OUTPATIENT)
Dept: ADMISSIONS | Facility: MEDICAL CENTER | Age: 71
DRG: 460 | End: 2021-02-17
Attending: NEUROLOGICAL SURGERY
Payer: COMMERCIAL

## 2021-02-17 ENCOUNTER — HOSPITAL ENCOUNTER (OUTPATIENT)
Dept: RADIOLOGY | Facility: MEDICAL CENTER | Age: 71
End: 2021-02-17
Payer: MEDICARE

## 2021-02-17 RX ORDER — GABAPENTIN 300 MG/1
300-900 CAPSULE ORAL 3 TIMES DAILY
COMMUNITY

## 2021-02-17 RX ORDER — ALBUTEROL SULFATE 90 UG/1
2 AEROSOL, METERED RESPIRATORY (INHALATION) EVERY 6 HOURS PRN
COMMUNITY

## 2021-02-17 RX ORDER — HYDROCODONE BITARTRATE AND ACETAMINOPHEN 10; 325 MG/1; MG/1
1 TABLET ORAL EVERY 4 HOURS PRN
COMMUNITY

## 2021-02-17 RX ORDER — OXYCODONE HCL 20 MG/1
20 TABLET, FILM COATED, EXTENDED RELEASE ORAL EVERY 12 HOURS
COMMUNITY

## 2021-02-21 NOTE — OR NURSING
COVID-19 Pre-surgery screenin. Do you have an undiagnosed respiratory illness or symptoms such as coughing or sneezing? *No** (Yes/No)   a. Onset of Sx *No**  b. Acute vs. chronic respiratory illness * No **    2. Do you have an unexplained fever that’s greater than 100.4 degrees     * No ** (Yes/No)     3. Have you had direct exposure to a patient who tested positive for Covid-19?     * No ** (Yes/No)    4. Have you had any loss of your sense of taste or smell? Have you had N/V or sore throat? * No **    Patient has been informed of visitor policy and asked to wear a mask upon entering the hospital   *YES** (Yes/No)

## 2021-02-22 ENCOUNTER — ANESTHESIA EVENT (OUTPATIENT)
Dept: SURGERY | Facility: MEDICAL CENTER | Age: 71
DRG: 460 | End: 2021-02-22
Payer: COMMERCIAL

## 2021-02-22 ENCOUNTER — ANESTHESIA (OUTPATIENT)
Dept: SURGERY | Facility: MEDICAL CENTER | Age: 71
DRG: 460 | End: 2021-02-22
Payer: COMMERCIAL

## 2021-02-22 ENCOUNTER — HOSPITAL ENCOUNTER (INPATIENT)
Facility: MEDICAL CENTER | Age: 71
LOS: 4 days | DRG: 460 | End: 2021-02-26
Attending: NEUROLOGICAL SURGERY | Admitting: NEUROLOGICAL SURGERY
Payer: COMMERCIAL

## 2021-02-22 ENCOUNTER — APPOINTMENT (OUTPATIENT)
Dept: RADIOLOGY | Facility: MEDICAL CENTER | Age: 71
DRG: 460 | End: 2021-02-22
Attending: NEUROLOGICAL SURGERY
Payer: COMMERCIAL

## 2021-02-22 DIAGNOSIS — M54.50 LOW BACK PAIN ASSOCIATED WITH A SPINAL DISORDER OTHER THAN RADICULOPATHY OR SPINAL STENOSIS: Primary | ICD-10-CM

## 2021-02-22 LAB
SARS-COV+SARS-COV-2 AG RESP QL IA.RAPID: NOTDETECTED
SARS-COV-2 RNA RESP QL NAA+PROBE: NOTDETECTED
SPECIMEN SOURCE: NORMAL
SPECIMEN SOURCE: NORMAL

## 2021-02-22 PROCEDURE — 160031 HCHG SURGERY MINUTES - 1ST 30 MINS LEVEL 5: Performed by: NEUROLOGICAL SURGERY

## 2021-02-22 PROCEDURE — 95863 NEEDLE EMG 3 EXTREMITIES: CPT | Performed by: NEUROLOGICAL SURGERY

## 2021-02-22 PROCEDURE — 500864 HCHG NEEDLE, SPINAL 18G: Performed by: NEUROLOGICAL SURGERY

## 2021-02-22 PROCEDURE — 95955 EEG DURING SURGERY: CPT | Performed by: NEUROLOGICAL SURGERY

## 2021-02-22 PROCEDURE — 0SG10A0 FUSION OF 2 OR MORE LUMBAR VERTEBRAL JOINTS WITH INTERBODY FUSION DEVICE, ANTERIOR APPROACH, ANTERIOR COLUMN, OPEN APPROACH: ICD-10-PCS | Performed by: NEUROLOGICAL SURGERY

## 2021-02-22 PROCEDURE — 500885 HCHG PACK, JACKSON TABLE: Performed by: NEUROLOGICAL SURGERY

## 2021-02-22 PROCEDURE — 72100 X-RAY EXAM L-S SPINE 2/3 VWS: CPT

## 2021-02-22 PROCEDURE — 700111 HCHG RX REV CODE 636 W/ 250 OVERRIDE (IP): Performed by: PHYSICIAN ASSISTANT

## 2021-02-22 PROCEDURE — A9270 NON-COVERED ITEM OR SERVICE: HCPCS | Performed by: NEUROLOGICAL SURGERY

## 2021-02-22 PROCEDURE — 110454 HCHG SHELL REV 250: Performed by: NEUROLOGICAL SURGERY

## 2021-02-22 PROCEDURE — 700101 HCHG RX REV CODE 250: Performed by: ANESTHESIOLOGY

## 2021-02-22 PROCEDURE — 501838 HCHG SUTURE GENERAL: Performed by: NEUROLOGICAL SURGERY

## 2021-02-22 PROCEDURE — 700111 HCHG RX REV CODE 636 W/ 250 OVERRIDE (IP)

## 2021-02-22 PROCEDURE — 160009 HCHG ANES TIME/MIN: Performed by: NEUROLOGICAL SURGERY

## 2021-02-22 PROCEDURE — 700105 HCHG RX REV CODE 258: Performed by: ANESTHESIOLOGY

## 2021-02-22 PROCEDURE — 700101 HCHG RX REV CODE 250: Performed by: NEUROLOGICAL SURGERY

## 2021-02-22 PROCEDURE — 95937 NEUROMUSCULAR JUNCTION TEST: CPT | Performed by: NEUROLOGICAL SURGERY

## 2021-02-22 PROCEDURE — 160002 HCHG RECOVERY MINUTES (STAT): Performed by: NEUROLOGICAL SURGERY

## 2021-02-22 PROCEDURE — 500444 HCHG HEMOSTAT, SURGICEL 2X3: Performed by: NEUROLOGICAL SURGERY

## 2021-02-22 PROCEDURE — 4A11X4G MONITORING OF PERIPHERAL NERVOUS ELECTRICAL ACTIVITY, INTRAOPERATIVE, EXTERNAL APPROACH: ICD-10-PCS | Performed by: NEUROLOGICAL SURGERY

## 2021-02-22 PROCEDURE — U0003 INFECTIOUS AGENT DETECTION BY NUCLEIC ACID (DNA OR RNA); SEVERE ACUTE RESPIRATORY SYNDROME CORONAVIRUS 2 (SARS-COV-2) (CORONAVIRUS DISEASE [COVID-19]), AMPLIFIED PROBE TECHNIQUE, MAKING USE OF HIGH THROUGHPUT TECHNOLOGIES AS DESCRIBED BY CMS-2020-01-R: HCPCS

## 2021-02-22 PROCEDURE — 95925 SOMATOSENSORY TESTING: CPT | Performed by: NEUROLOGICAL SURGERY

## 2021-02-22 PROCEDURE — 502240 HCHG MISC OR SUPPLY RC 0272: Performed by: NEUROLOGICAL SURGERY

## 2021-02-22 PROCEDURE — 160048 HCHG OR STATISTICAL LEVEL 1-5: Performed by: NEUROLOGICAL SURGERY

## 2021-02-22 PROCEDURE — 95908 NRV CNDJ TST 3-4 STUDIES: CPT | Performed by: NEUROLOGICAL SURGERY

## 2021-02-22 PROCEDURE — 700111 HCHG RX REV CODE 636 W/ 250 OVERRIDE (IP): Performed by: ANESTHESIOLOGY

## 2021-02-22 PROCEDURE — 160035 HCHG PACU - 1ST 60 MINS PHASE I: Performed by: NEUROLOGICAL SURGERY

## 2021-02-22 PROCEDURE — 700111 HCHG RX REV CODE 636 W/ 250 OVERRIDE (IP): Performed by: NEUROLOGICAL SURGERY

## 2021-02-22 PROCEDURE — U0005 INFEC AGEN DETEC AMPLI PROBE: HCPCS

## 2021-02-22 PROCEDURE — 87426 SARSCOV CORONAVIRUS AG IA: CPT

## 2021-02-22 PROCEDURE — 95940 IONM IN OPERATNG ROOM 15 MIN: CPT | Performed by: NEUROLOGICAL SURGERY

## 2021-02-22 PROCEDURE — 770006 HCHG ROOM/CARE - MED/SURG/GYN SEMI*

## 2021-02-22 PROCEDURE — 700106 HCHG RX REV CODE 271: Performed by: NEUROLOGICAL SURGERY

## 2021-02-22 PROCEDURE — 500512 HCHG ENDO PEANUT: Performed by: NEUROLOGICAL SURGERY

## 2021-02-22 PROCEDURE — 700105 HCHG RX REV CODE 258: Performed by: PHYSICIAN ASSISTANT

## 2021-02-22 PROCEDURE — 160042 HCHG SURGERY MINUTES - EA ADDL 1 MIN LEVEL 5: Performed by: NEUROLOGICAL SURGERY

## 2021-02-22 PROCEDURE — 0SB20ZZ EXCISION OF LUMBAR VERTEBRAL DISC, OPEN APPROACH: ICD-10-PCS | Performed by: NEUROLOGICAL SURGERY

## 2021-02-22 PROCEDURE — 160036 HCHG PACU - EA ADDL 30 MINS PHASE I: Performed by: NEUROLOGICAL SURGERY

## 2021-02-22 PROCEDURE — 502000 HCHG MISC OR IMPLANTS RC 0278: Performed by: NEUROLOGICAL SURGERY

## 2021-02-22 PROCEDURE — 700105 HCHG RX REV CODE 258: Performed by: NEUROLOGICAL SURGERY

## 2021-02-22 DEVICE — PUTTY 10CC NANOBONE (1/EA): Type: IMPLANTABLE DEVICE | Site: SPINE LUMBAR | Status: FUNCTIONAL

## 2021-02-22 RX ORDER — SODIUM CHLORIDE, SODIUM LACTATE, POTASSIUM CHLORIDE, CALCIUM CHLORIDE 600; 310; 30; 20 MG/100ML; MG/100ML; MG/100ML; MG/100ML
INJECTION, SOLUTION INTRAVENOUS CONTINUOUS
Status: ACTIVE | OUTPATIENT
Start: 2021-02-22 | End: 2021-02-22

## 2021-02-22 RX ORDER — SODIUM CHLORIDE, SODIUM LACTATE, POTASSIUM CHLORIDE, AND CALCIUM CHLORIDE .6; .31; .03; .02 G/100ML; G/100ML; G/100ML; G/100ML
IRRIGANT IRRIGATION
Status: DISCONTINUED | OUTPATIENT
Start: 2021-02-22 | End: 2021-02-22 | Stop reason: HOSPADM

## 2021-02-22 RX ORDER — HYDROMORPHONE HYDROCHLORIDE 1 MG/ML
0.4 INJECTION, SOLUTION INTRAMUSCULAR; INTRAVENOUS; SUBCUTANEOUS
Status: DISCONTINUED | OUTPATIENT
Start: 2021-02-22 | End: 2021-02-22 | Stop reason: HOSPADM

## 2021-02-22 RX ORDER — ONDANSETRON 2 MG/ML
4 INJECTION INTRAMUSCULAR; INTRAVENOUS
Status: DISCONTINUED | OUTPATIENT
Start: 2021-02-22 | End: 2021-02-22 | Stop reason: HOSPADM

## 2021-02-22 RX ORDER — SODIUM CHLORIDE, SODIUM LACTATE, POTASSIUM CHLORIDE, CALCIUM CHLORIDE 600; 310; 30; 20 MG/100ML; MG/100ML; MG/100ML; MG/100ML
INJECTION, SOLUTION INTRAVENOUS CONTINUOUS
Status: DISCONTINUED | OUTPATIENT
Start: 2021-02-22 | End: 2021-02-22 | Stop reason: HOSPADM

## 2021-02-22 RX ORDER — DOCUSATE SODIUM 100 MG/1
100 CAPSULE, LIQUID FILLED ORAL 2 TIMES DAILY
Status: DISCONTINUED | OUTPATIENT
Start: 2021-02-22 | End: 2021-02-26 | Stop reason: HOSPADM

## 2021-02-22 RX ORDER — OXYCODONE HCL 5 MG/5 ML
10 SOLUTION, ORAL ORAL
Status: DISCONTINUED | OUTPATIENT
Start: 2021-02-22 | End: 2021-02-22 | Stop reason: HOSPADM

## 2021-02-22 RX ORDER — DEXAMETHASONE SODIUM PHOSPHATE 4 MG/ML
INJECTION, SOLUTION INTRA-ARTICULAR; INTRALESIONAL; INTRAMUSCULAR; INTRAVENOUS; SOFT TISSUE PRN
Status: DISCONTINUED | OUTPATIENT
Start: 2021-02-22 | End: 2021-02-22 | Stop reason: SURG

## 2021-02-22 RX ORDER — AMOXICILLIN 250 MG
1 CAPSULE ORAL NIGHTLY
Status: DISCONTINUED | OUTPATIENT
Start: 2021-02-22 | End: 2021-02-26 | Stop reason: HOSPADM

## 2021-02-22 RX ORDER — SODIUM CHLORIDE 9 MG/ML
INJECTION, SOLUTION INTRAVENOUS CONTINUOUS
Status: DISCONTINUED | OUTPATIENT
Start: 2021-02-22 | End: 2021-02-26 | Stop reason: HOSPADM

## 2021-02-22 RX ORDER — LABETALOL HYDROCHLORIDE 5 MG/ML
10 INJECTION, SOLUTION INTRAVENOUS
Status: DISCONTINUED | OUTPATIENT
Start: 2021-02-22 | End: 2021-02-26 | Stop reason: HOSPADM

## 2021-02-22 RX ORDER — MEPERIDINE HYDROCHLORIDE 25 MG/ML
12.5 INJECTION INTRAMUSCULAR; INTRAVENOUS; SUBCUTANEOUS
Status: DISCONTINUED | OUTPATIENT
Start: 2021-02-22 | End: 2021-02-22 | Stop reason: HOSPADM

## 2021-02-22 RX ORDER — CEFAZOLIN SODIUM 1 G/3ML
INJECTION, POWDER, FOR SOLUTION INTRAMUSCULAR; INTRAVENOUS PRN
Status: DISCONTINUED | OUTPATIENT
Start: 2021-02-22 | End: 2021-02-22 | Stop reason: SURG

## 2021-02-22 RX ORDER — HALOPERIDOL 5 MG/ML
1 INJECTION INTRAMUSCULAR
Status: DISCONTINUED | OUTPATIENT
Start: 2021-02-22 | End: 2021-02-22 | Stop reason: HOSPADM

## 2021-02-22 RX ORDER — LIDOCAINE HYDROCHLORIDE 10 MG/ML
INJECTION, SOLUTION EPIDURAL; INFILTRATION; INTRACAUDAL; PERINEURAL
Status: COMPLETED
Start: 2021-02-22 | End: 2021-02-22

## 2021-02-22 RX ORDER — CEFAZOLIN SODIUM 1 G/3ML
INJECTION, POWDER, FOR SOLUTION INTRAMUSCULAR; INTRAVENOUS
Status: DISCONTINUED | OUTPATIENT
Start: 2021-02-22 | End: 2021-02-22 | Stop reason: HOSPADM

## 2021-02-22 RX ORDER — POLYETHYLENE GLYCOL 3350 17 G/17G
1 POWDER, FOR SOLUTION ORAL 2 TIMES DAILY PRN
Status: DISCONTINUED | OUTPATIENT
Start: 2021-02-22 | End: 2021-02-26 | Stop reason: HOSPADM

## 2021-02-22 RX ORDER — MIDAZOLAM HYDROCHLORIDE 1 MG/ML
1 INJECTION INTRAMUSCULAR; INTRAVENOUS
Status: DISCONTINUED | OUTPATIENT
Start: 2021-02-22 | End: 2021-02-22 | Stop reason: HOSPADM

## 2021-02-22 RX ORDER — AMOXICILLIN 250 MG
1 CAPSULE ORAL
Status: DISCONTINUED | OUTPATIENT
Start: 2021-02-22 | End: 2021-02-26 | Stop reason: HOSPADM

## 2021-02-22 RX ORDER — HYDROMORPHONE HYDROCHLORIDE 1 MG/ML
0.2 INJECTION, SOLUTION INTRAMUSCULAR; INTRAVENOUS; SUBCUTANEOUS
Status: DISCONTINUED | OUTPATIENT
Start: 2021-02-22 | End: 2021-02-22 | Stop reason: HOSPADM

## 2021-02-22 RX ORDER — BISACODYL 10 MG
10 SUPPOSITORY, RECTAL RECTAL
Status: DISCONTINUED | OUTPATIENT
Start: 2021-02-22 | End: 2021-02-26 | Stop reason: HOSPADM

## 2021-02-22 RX ORDER — SODIUM CHLORIDE, SODIUM LACTATE, POTASSIUM CHLORIDE, CALCIUM CHLORIDE 600; 310; 30; 20 MG/100ML; MG/100ML; MG/100ML; MG/100ML
INJECTION, SOLUTION INTRAVENOUS
Status: DISCONTINUED | OUTPATIENT
Start: 2021-02-22 | End: 2021-02-22 | Stop reason: SURG

## 2021-02-22 RX ORDER — BUPIVACAINE HYDROCHLORIDE AND EPINEPHRINE 5; 5 MG/ML; UG/ML
INJECTION, SOLUTION PERINEURAL
Status: DISCONTINUED | OUTPATIENT
Start: 2021-02-22 | End: 2021-02-22 | Stop reason: HOSPADM

## 2021-02-22 RX ORDER — ENEMA 19; 7 G/133ML; G/133ML
1 ENEMA RECTAL
Status: DISCONTINUED | OUTPATIENT
Start: 2021-02-22 | End: 2021-02-26 | Stop reason: HOSPADM

## 2021-02-22 RX ORDER — DIPHENHYDRAMINE HYDROCHLORIDE 50 MG/ML
25 INJECTION INTRAMUSCULAR; INTRAVENOUS EVERY 6 HOURS PRN
Status: DISCONTINUED | OUTPATIENT
Start: 2021-02-22 | End: 2021-02-26 | Stop reason: HOSPADM

## 2021-02-22 RX ORDER — VITAMIN B COMPLEX
5000 TABLET ORAL DAILY
Status: DISCONTINUED | OUTPATIENT
Start: 2021-02-23 | End: 2021-02-26 | Stop reason: HOSPADM

## 2021-02-22 RX ORDER — DIPHENHYDRAMINE HYDROCHLORIDE 50 MG/ML
12.5 INJECTION INTRAMUSCULAR; INTRAVENOUS
Status: DISCONTINUED | OUTPATIENT
Start: 2021-02-22 | End: 2021-02-22 | Stop reason: HOSPADM

## 2021-02-22 RX ORDER — CYCLOBENZAPRINE HCL 10 MG
10 TABLET ORAL EVERY 8 HOURS PRN
Status: DISCONTINUED | OUTPATIENT
Start: 2021-02-22 | End: 2021-02-25

## 2021-02-22 RX ORDER — ONDANSETRON 2 MG/ML
4 INJECTION INTRAMUSCULAR; INTRAVENOUS EVERY 4 HOURS PRN
Status: DISCONTINUED | OUTPATIENT
Start: 2021-02-22 | End: 2021-02-26 | Stop reason: HOSPADM

## 2021-02-22 RX ORDER — CEFAZOLIN SODIUM 2 G/100ML
2 INJECTION, SOLUTION INTRAVENOUS EVERY 8 HOURS
Status: COMPLETED | OUTPATIENT
Start: 2021-02-23 | End: 2021-02-23

## 2021-02-22 RX ORDER — ONDANSETRON 4 MG/1
4 TABLET, ORALLY DISINTEGRATING ORAL EVERY 4 HOURS PRN
Status: DISCONTINUED | OUTPATIENT
Start: 2021-02-22 | End: 2021-02-26 | Stop reason: HOSPADM

## 2021-02-22 RX ORDER — PHENYLEPHRINE HCL IN 0.9% NACL 0.5 MG/5ML
SYRINGE (ML) INTRAVENOUS PRN
Status: DISCONTINUED | OUTPATIENT
Start: 2021-02-22 | End: 2021-02-22 | Stop reason: SURG

## 2021-02-22 RX ORDER — HYDROMORPHONE HYDROCHLORIDE 1 MG/ML
0.1 INJECTION, SOLUTION INTRAMUSCULAR; INTRAVENOUS; SUBCUTANEOUS
Status: DISCONTINUED | OUTPATIENT
Start: 2021-02-22 | End: 2021-02-22 | Stop reason: HOSPADM

## 2021-02-22 RX ORDER — DIPHENHYDRAMINE HCL 25 MG
25 TABLET ORAL EVERY 6 HOURS PRN
Status: DISCONTINUED | OUTPATIENT
Start: 2021-02-22 | End: 2021-02-26 | Stop reason: HOSPADM

## 2021-02-22 RX ORDER — OXYCODONE HCL 5 MG/5 ML
5 SOLUTION, ORAL ORAL
Status: DISCONTINUED | OUTPATIENT
Start: 2021-02-22 | End: 2021-02-22 | Stop reason: HOSPADM

## 2021-02-22 RX ADMIN — Medication 100 MCG: at 18:02

## 2021-02-22 RX ADMIN — Medication 0.5 ML: at 14:02

## 2021-02-22 RX ADMIN — LIDOCAINE HYDROCHLORIDE 0.5 ML: 10 INJECTION, SOLUTION EPIDURAL; INFILTRATION; INTRACAUDAL at 14:02

## 2021-02-22 RX ADMIN — SODIUM CHLORIDE: 9 INJECTION, SOLUTION INTRAVENOUS at 22:17

## 2021-02-22 RX ADMIN — SODIUM CHLORIDE, POTASSIUM CHLORIDE, SODIUM LACTATE AND CALCIUM CHLORIDE: 600; 310; 30; 20 INJECTION, SOLUTION INTRAVENOUS at 17:19

## 2021-02-22 RX ADMIN — PROPOFOL 150 MCG/KG/MIN: 10 INJECTION, EMULSION INTRAVENOUS at 17:26

## 2021-02-22 RX ADMIN — Medication 100 MCG: at 18:11

## 2021-02-22 RX ADMIN — DEXAMETHASONE SODIUM PHOSPHATE 8 MG: 4 INJECTION, SOLUTION INTRA-ARTICULAR; INTRALESIONAL; INTRAMUSCULAR; INTRAVENOUS; SOFT TISSUE at 17:37

## 2021-02-22 RX ADMIN — FENTANYL CITRATE 100 MCG: 50 INJECTION, SOLUTION INTRAMUSCULAR; INTRAVENOUS at 17:38

## 2021-02-22 RX ADMIN — EPHEDRINE SULFATE 10 MG: 50 INJECTION, SOLUTION INTRAVENOUS at 18:15

## 2021-02-22 RX ADMIN — FENTANYL CITRATE 100 MCG: 50 INJECTION, SOLUTION INTRAMUSCULAR; INTRAVENOUS at 17:23

## 2021-02-22 RX ADMIN — POVIDONE IODINE 15 ML: 100 SOLUTION TOPICAL at 14:02

## 2021-02-22 RX ADMIN — FENTANYL CITRATE 50 MCG: 50 INJECTION, SOLUTION INTRAMUSCULAR; INTRAVENOUS at 18:04

## 2021-02-22 RX ADMIN — HYDROMORPHONE HYDROCHLORIDE: 10 INJECTION, SOLUTION INTRAMUSCULAR; INTRAVENOUS; SUBCUTANEOUS at 22:20

## 2021-02-22 RX ADMIN — Medication 100 MCG: at 17:54

## 2021-02-22 RX ADMIN — SODIUM CHLORIDE, POTASSIUM CHLORIDE, SODIUM LACTATE AND CALCIUM CHLORIDE: 600; 310; 30; 20 INJECTION, SOLUTION INTRAVENOUS at 14:02

## 2021-02-22 RX ADMIN — CEFAZOLIN 3 G: 330 INJECTION, POWDER, FOR SOLUTION INTRAMUSCULAR; INTRAVENOUS at 17:43

## 2021-02-22 RX ADMIN — PROPOFOL 200 MG: 10 INJECTION, EMULSION INTRAVENOUS at 17:25

## 2021-02-22 ASSESSMENT — PAIN DESCRIPTION - PAIN TYPE
TYPE: SURGICAL PAIN

## 2021-02-22 ASSESSMENT — PAIN SCALES - GENERAL: PAIN_LEVEL: 4

## 2021-02-22 NOTE — PROGRESS NOTES
Med rec complete per pt at bedside with home med list (returned)  Interviewed pt with family at bedside   Allergies reviewed and updated.

## 2021-02-23 ENCOUNTER — APPOINTMENT (OUTPATIENT)
Dept: RADIOLOGY | Facility: MEDICAL CENTER | Age: 71
DRG: 460 | End: 2021-02-23
Attending: PHYSICIAN ASSISTANT
Payer: COMMERCIAL

## 2021-02-23 LAB
ANION GAP SERPL CALC-SCNC: 7 MMOL/L (ref 7–16)
BUN SERPL-MCNC: 21 MG/DL (ref 8–22)
CALCIUM SERPL-MCNC: 9.6 MG/DL (ref 8.5–10.5)
CHLORIDE SERPL-SCNC: 100 MMOL/L (ref 96–112)
CO2 SERPL-SCNC: 25 MMOL/L (ref 20–33)
CREAT SERPL-MCNC: 1.55 MG/DL (ref 0.5–1.4)
ERYTHROCYTE [DISTWIDTH] IN BLOOD BY AUTOMATED COUNT: 46.6 FL (ref 35.9–50)
GLUCOSE SERPL-MCNC: 136 MG/DL (ref 65–99)
HCT VFR BLD AUTO: 35.8 % (ref 42–52)
HGB BLD-MCNC: 11.2 G/DL (ref 14–18)
MCH RBC QN AUTO: 27.6 PG (ref 27–33)
MCHC RBC AUTO-ENTMCNC: 31.3 G/DL (ref 33.7–35.3)
MCV RBC AUTO: 88.2 FL (ref 81.4–97.8)
PLATELET # BLD AUTO: 392 K/UL (ref 164–446)
PMV BLD AUTO: 9 FL (ref 9–12.9)
POTASSIUM SERPL-SCNC: 4.9 MMOL/L (ref 3.6–5.5)
RBC # BLD AUTO: 4.06 M/UL (ref 4.7–6.1)
SODIUM SERPL-SCNC: 132 MMOL/L (ref 135–145)
WBC # BLD AUTO: 15.8 K/UL (ref 4.8–10.8)

## 2021-02-23 PROCEDURE — 700102 HCHG RX REV CODE 250 W/ 637 OVERRIDE(OP): Performed by: PHYSICIAN ASSISTANT

## 2021-02-23 PROCEDURE — 97166 OT EVAL MOD COMPLEX 45 MIN: CPT

## 2021-02-23 PROCEDURE — 97162 PT EVAL MOD COMPLEX 30 MIN: CPT

## 2021-02-23 PROCEDURE — 700111 HCHG RX REV CODE 636 W/ 250 OVERRIDE (IP): Performed by: PHYSICIAN ASSISTANT

## 2021-02-23 PROCEDURE — 72100 X-RAY EXAM L-S SPINE 2/3 VWS: CPT

## 2021-02-23 PROCEDURE — 36415 COLL VENOUS BLD VENIPUNCTURE: CPT

## 2021-02-23 PROCEDURE — A9270 NON-COVERED ITEM OR SERVICE: HCPCS | Performed by: PHYSICIAN ASSISTANT

## 2021-02-23 PROCEDURE — 770006 HCHG ROOM/CARE - MED/SURG/GYN SEMI*

## 2021-02-23 PROCEDURE — 80048 BASIC METABOLIC PNL TOTAL CA: CPT

## 2021-02-23 PROCEDURE — 85027 COMPLETE CBC AUTOMATED: CPT

## 2021-02-23 PROCEDURE — 700105 HCHG RX REV CODE 258: Performed by: PHYSICIAN ASSISTANT

## 2021-02-23 RX ORDER — OXYCODONE HCL 20 MG/1
20 TABLET, FILM COATED, EXTENDED RELEASE ORAL 2 TIMES DAILY
Status: DISCONTINUED | OUTPATIENT
Start: 2021-02-23 | End: 2021-02-26 | Stop reason: HOSPADM

## 2021-02-23 RX ORDER — OXYCODONE HYDROCHLORIDE 10 MG/1
10 TABLET ORAL EVERY 4 HOURS PRN
Status: DISCONTINUED | OUTPATIENT
Start: 2021-02-23 | End: 2021-02-26 | Stop reason: HOSPADM

## 2021-02-23 RX ADMIN — SODIUM CHLORIDE: 9 INJECTION, SOLUTION INTRAVENOUS at 08:59

## 2021-02-23 RX ADMIN — CYCLOBENZAPRINE 10 MG: 10 TABLET, FILM COATED ORAL at 21:46

## 2021-02-23 RX ADMIN — OXYCODONE HYDROCHLORIDE 20 MG: 20 TABLET, FILM COATED, EXTENDED RELEASE ORAL at 17:52

## 2021-02-23 RX ADMIN — HYDROMORPHONE HYDROCHLORIDE: 10 INJECTION, SOLUTION INTRAMUSCULAR; INTRAVENOUS; SUBCUTANEOUS at 08:59

## 2021-02-23 RX ADMIN — CEFAZOLIN SODIUM 2 G: 2 INJECTION, SOLUTION INTRAVENOUS at 10:22

## 2021-02-23 RX ADMIN — OXYCODONE HYDROCHLORIDE 10 MG: 10 TABLET ORAL at 21:44

## 2021-02-23 RX ADMIN — CEFAZOLIN SODIUM 2 G: 2 INJECTION, SOLUTION INTRAVENOUS at 02:00

## 2021-02-23 RX ADMIN — Medication 5000 UNITS: at 05:04

## 2021-02-23 RX ADMIN — DOCUSATE SODIUM 100 MG: 100 CAPSULE, LIQUID FILLED ORAL at 17:52

## 2021-02-23 RX ADMIN — SODIUM CHLORIDE: 9 INJECTION, SOLUTION INTRAVENOUS at 21:47

## 2021-02-23 RX ADMIN — OXYCODONE HYDROCHLORIDE 20 MG: 20 TABLET, FILM COATED, EXTENDED RELEASE ORAL at 13:03

## 2021-02-23 RX ADMIN — DOCUSATE SODIUM 50 MG AND SENNOSIDES 8.6 MG 1 TABLET: 8.6; 5 TABLET, FILM COATED ORAL at 21:44

## 2021-02-23 ASSESSMENT — COGNITIVE AND FUNCTIONAL STATUS - GENERAL
MOBILITY SCORE: 20
SUGGESTED CMS G CODE MODIFIER MOBILITY: CJ
DAILY ACTIVITIY SCORE: 19
WALKING IN HOSPITAL ROOM: A LOT
MOVING TO AND FROM BED TO CHAIR: A LITTLE
HELP NEEDED FOR BATHING: A LITTLE
DRESSING REGULAR LOWER BODY CLOTHING: A LOT
DAILY ACTIVITIY SCORE: 18
MOVING FROM LYING ON BACK TO SITTING ON SIDE OF FLAT BED: A LITTLE
STANDING UP FROM CHAIR USING ARMS: A LITTLE
DRESSING REGULAR LOWER BODY CLOTHING: A LOT
SUGGESTED CMS G CODE MODIFIER DAILY ACTIVITY: CK
SUGGESTED CMS G CODE MODIFIER DAILY ACTIVITY: CK
HELP NEEDED FOR BATHING: A LOT
MOVING TO AND FROM BED TO CHAIR: A LITTLE
CLIMB 3 TO 5 STEPS WITH RAILING: A LOT
DRESSING REGULAR UPPER BODY CLOTHING: A LITTLE
DRESSING REGULAR UPPER BODY CLOTHING: A LITTLE
CLIMB 3 TO 5 STEPS WITH RAILING: A LITTLE
MOBILITY SCORE: 16
TURNING FROM BACK TO SIDE WHILE IN FLAT BAD: A LITTLE
WALKING IN HOSPITAL ROOM: A LITTLE
TOILETING: A LITTLE
STANDING UP FROM CHAIR USING ARMS: A LITTLE
TOILETING: A LITTLE
SUGGESTED CMS G CODE MODIFIER MOBILITY: CK

## 2021-02-23 ASSESSMENT — GAIT ASSESSMENTS
DEVIATION: BRADYKINETIC
ASSISTIVE DEVICE: FRONT WHEEL WALKER
GAIT LEVEL OF ASSIST: SUPERVISED
DISTANCE (FEET): 20
DISTANCE (FEET): 20

## 2021-02-23 ASSESSMENT — PAIN DESCRIPTION - PAIN TYPE
TYPE: SURGICAL PAIN
TYPE: ACUTE PAIN;SURGICAL PAIN
TYPE: SURGICAL PAIN

## 2021-02-23 ASSESSMENT — LIFESTYLE VARIABLES
TOTAL SCORE: 0
ON A TYPICAL DAY WHEN YOU DRINK ALCOHOL HOW MANY DRINKS DO YOU HAVE: 0
EVER HAD A DRINK FIRST THING IN THE MORNING TO STEADY YOUR NERVES TO GET RID OF A HANGOVER: NO
EVER FELT BAD OR GUILTY ABOUT YOUR DRINKING: NO
CONSUMPTION TOTAL: NEGATIVE
HAVE PEOPLE ANNOYED YOU BY CRITICIZING YOUR DRINKING: NO
TOTAL SCORE: 0
ALCOHOL_USE: NO
HAVE YOU EVER FELT YOU SHOULD CUT DOWN ON YOUR DRINKING: NO
AVERAGE NUMBER OF DAYS PER WEEK YOU HAVE A DRINK CONTAINING ALCOHOL: 0
TOTAL SCORE: 0
HOW MANY TIMES IN THE PAST YEAR HAVE YOU HAD 5 OR MORE DRINKS IN A DAY: 0

## 2021-02-23 ASSESSMENT — PATIENT HEALTH QUESTIONNAIRE - PHQ9
2. FEELING DOWN, DEPRESSED, IRRITABLE, OR HOPELESS: NOT AT ALL
SUM OF ALL RESPONSES TO PHQ9 QUESTIONS 1 AND 2: 0
2. FEELING DOWN, DEPRESSED, IRRITABLE, OR HOPELESS: NOT AT ALL
SUM OF ALL RESPONSES TO PHQ9 QUESTIONS 1 AND 2: 0
1. LITTLE INTEREST OR PLEASURE IN DOING THINGS: NOT AT ALL
1. LITTLE INTEREST OR PLEASURE IN DOING THINGS: NOT AT ALL

## 2021-02-23 ASSESSMENT — ACTIVITIES OF DAILY LIVING (ADL): TOILETING: REQUIRES ASSIST

## 2021-02-23 NOTE — DISCHARGE PLANNING
Received call from Cara from [place] [phone]. She is worker's comp CM and can assist with DCP.

## 2021-02-23 NOTE — CARE PLAN
Problem: Knowledge Deficit  Goal: Knowledge of the prescribed therapeutic regimen will improve  Outcome: PROGRESSING AS EXPECTED  Patient is alert and oriented and understands plan of care.     Problem: Safety  Goal: Will remain free from falls  Outcome: PROGRESSING AS EXPECTED  Safety precautions are in place including bed locked and in lowest position, upper bed rails up, bed alarm on, call light within reach, treaded socks on, tray table and personal belongings within reach.

## 2021-02-23 NOTE — THERAPY
Physical Therapy   Initial Evaluation     Patient Name: Harrison Gan  Age:  70 y.o., Sex:  male  Medical Record #: [id number]  Today's Date: 2/23/2021     Precautions: (P) Fall Risk, Spinal / Back Precautions , TLSO (Thoracolumbosacral orthosis)    Assessment  Patient is 70 y.o. male POD#1 L2-L5 with plates. Patient lives with wife who can assist PRN. Patient reporting increased difficulty with ADLs recently d/t pain, no falls. At time of evaluation, patient limited by pain, BLE weakness, impaired balance, and decreased activity tolerance. Patient will benefit from skilled IP PT in house to address impairments in order to discharge home safely.    Plan    Recommend Physical Therapy 3 times per week until therapy goals are met for the following treatments:  Bed Mobility, Gait Training and Stair Training    DC Equipment Recommendations: (P) Front-Wheel Walker  Discharge Recommendations: (P) Recommend home health for continued physical therapy services        Objective     02/23/21 1009   Prior Living Situation   Housing / Facility 1 Story House   Steps Into Home 2   Steps In Home 0   Bathroom Set up Walk In Shower   Equipment Owned Raised Toilet Seat Without Arms;4-Wheel Walker;Grab Bar(s) In Tub / Shower   Lives with - Patient's Self Care Capacity Spouse   Comments req assist from wife for dressing and showering   Prior Level of Functional Mobility   Bed Mobility Independent   Transfer Status Independent   Ambulation Independent   Distance Ambulation (Feet)   (limited community distances)   Assistive Devices Used Single Point Cane  (x2 SPC)   Stairs Independent   Gait Analysis   Gait Level Of Assist Supervised   Assistive Device Front Wheel Walker   Distance (Feet) 20   # of Times Distance was Traveled 1   Deviation Bradykinetic   # of Stairs Climbed 0   Bed Mobility    Supine to Sit Minimal Assist   Sit to Supine Minimal Assist   Scooting Supervised   Rolling Supervised   Functional Mobility   Sit to Stand  Supervised   Bed, Chair, Wheelchair Transfer Supervised   Toilet Transfers Supervised   Transfer Method Stand Step   Mobility w/ FWW   Short Term Goals    Short Term Goal # 1 Patient will be able to demonstrate bed mobility sup<>sit with SPV in 6 visits in order to incrs fxnal IND   Short Term Goal # 2 Patient will be able to demonstrate ambulation x200' with LRAD SPV in 6 visits in order to incrs fxnal IND   Short Term Goal # 3 Patient will be able to demonstrate x2 stair negotiation with LRAD SPV in 6 visits in order to incrs fxnal IND   Anticipated Discharge Equipment and Recommendations   DC Equipment Recommendations Front-Wheel Walker   Discharge Recommendations Recommend home health for continued physical therapy services

## 2021-02-23 NOTE — CARE PLAN
Problem: Communication  Goal: The ability to communicate needs accurately and effectively will improve  Outcome: PROGRESSING AS EXPECTED     Problem: Safety  Goal: Will remain free from injury  Outcome: PROGRESSING AS EXPECTED     Problem: Infection  Goal: Will remain free from infection  Outcome: PROGRESSING AS EXPECTED     Problem: Knowledge Deficit  Goal: Knowledge of disease process/condition, treatment plan, diagnostic tests, and medications will improve  Outcome: PROGRESSING AS EXPECTED     Problem: Pain Management  Goal: Pain level will decrease to patient's comfort goal  Outcome: PROGRESSING AS EXPECTED     Plan of care reviewed, verbalizes understanding. Focusing on pain management. PCA in use. Encouraged to call if pain is no longer controlled. PCA education provided. Infectious precautions in place, IV antibiotics in use. Bed alarm on, bed locked and in lowest position. Call light in reach.

## 2021-02-23 NOTE — OR SURGEON
Immediate Post OP Note    PreOp Diagnosis: L4,5 anterolisthesis, L2,3 severe stenosis/retrolisthesis, loss of disc height, L3,4 moderate stenosis, intractable pain that has failed conservative treatment    PostOp Diagnosis: none    Procedure(s):  FUSION, SPINE, LUMBAR, INTERBODY, OBLIQUE APPROACH - L2-5 - Wound Class: Clean  FUSION, SPINE, LUMBAR, PLIF - L2-5 W/INST - Wound Class: Clean  LAMINECTOMY, SPINE, LUMBAR, WITH DISCECTOMY. - Wound Class: Clean    Surgeon(s):  OLAYINKA Crum M.D.    Anesthesiologist/Type of Anesthesia:  Anesthesiologist: Yonas Dempsey M.D./General    Surgical Staff:  Assistant: Nai Meek P.A.-C.  Circulator: Yeimi Dee R.N.  Relief Circulator: Pricila Hedrick R.N.  Relief Scrub: Muna Nassar  Scrub Person: Judit Harris Assist: Germaine Sutton, Student  Radiology Technologist: Peter Childs    Specimens removed if any:  * No specimens in log *    Estimated Blood Loss: < 50 cc    Findings: nice placement of hardware, stable ssep's, emg's,     Complications: none        2/22/2021 7:57 PM Michael Lee M.D.

## 2021-02-23 NOTE — OP REPORT
DATE OF SERVICE:  02/22/2021     PREOPERATIVE DIAGNOSES:  L4-5 anterolisthesis and stenosis, L2-3 severe   stenosis and retrolisthesis and loss of disk height and L3-4 moderate stenosis   and intractable pain that has failed conservative treatment.     POSTOPERATIVE DIAGNOSES:  L4-5 anterolisthesis and stenosis, L2-3 severe   stenosis and retrolisthesis and loss of disk height and L3-4 moderate stenosis   and intractable pain that has failed conservative treatment.     PRINCIPAL PROCEDURES PERFORMED:  1.  Left-sided approach for L4-5 OLIF with a plate with placement of a 4WEB   titanium truss interbody cage and placement of a Elite surgical 2-hole plate   with 55 mm screws.  2.  L3-4 anterior to psoas lumbar interbody fusion with placement of a 4WEB 14   mm in height, 18 mm in width cage and no plate was placed.  A L3-4 fusion was   thus performed.  3.  L2-3 anterior to psoas interbody fusion with nice increase in foraminal   height and nice correction of the retrolisthesis with placement of a [id number]   4WEB titanium truss interbody cage.  Please note that a plate was placed at   L2-3 with two 55 mm screws.  Please note that these were plates that were   distinct from the cages themselves and not interval to the cages.     SURGEON:  Michael Lee MD     ASSISTANT:  Hollis Gill MD     ANESTHESIA:  The procedure was performed under general anesthesia.     ANESTHESIOLOGIST:  Yonas Dempsey MD     COMPLICATIONS:  There were no complications.     FINDINGS:  Include evidence of nice placement of hardware with stable SSEPs   and EMGs.     IV FLUIDS:  Please see the anesthesia record.     URINE OUTPUT:  Please see the anesthesia record.     ESTIMATED BLOOD LOSS:  Please see the anesthesia record.     DISPOSITION:  Patient will be extubated and brought to the recovery room.     CLINICAL HISTORY:  The patient is a 70-year-old male who presents with back   pain and bilateral shooting leg pain.  He had  developed severe stenosis at   L2-3 with retrolisthesis and a disk herniation and severe stenosis.  The   patient also had findings at L3-L4 and L4-L5.  I recommended a stage 360   approach.  The patient had hurt himself during an industrial injury.  The   patient understood risks, benefits and options and he was eager to proceed   with surgery.     DESCRIPTION OF PROCEDURE:  He was brought to the operating room and placed   under general anesthesia.  A Steele catheter was placed.  The patient was   placed into the right lateral decubitus position and all pressure points were   padded.  The left flank was prepped and draped in the usual sterile fashion.    Remote monitoring was performed by [facility]itoring associates.  I planned an   incision at L4-5 in a separate incision so that I can tackle L2-3 and L3-4.  I   started at L2-3 and worked my way caudally.  After a timeout was performed,   local anesthetic was infiltrated in the skin.  I made a transverse incision   centered over the L3 vertebral body anterior to it.  The fascia was entered   and a muscle-splitting approach was performed into the retroperitoneum.  I   swept any visceral contents anteriorly and I had a clear view of the anterior   portion of the psoas muscle.  I docked on to the anterior third of the disk   space and I swept any muscles posteriorly.  I used a series of dilators to   place 130 mm blades on the minimally invasive retractor.  This was turned   backwards and I placed the posterior blade anteriorly and I placed an   intradiscal theresa.  I was able to visualize the disk space with minimal _____.   I made an annulotomy.  A very thorough diskectomy and cartilaginous endplate   preparation and a contralateral annular release with intraoperative   fluoroscopy and a Alvarado periosteal elevator.  I placed a [id number] in length   4WEB titanium truss interbody cage packed with NanoBone.  Next, I secured an   Elite surgical 2-hole plate by using 55 mm  screws and the locking mechanism   was engaged.  Next, attention was paid to L3-4.  A similar technique was used   to place a 14x18 mm interbody cage packed with NanoBone for an L3-4 interbody   fusion.  I have decided not to put a plate at this level.  Next, attention was   turned to L4-L5 through a separate fascial incision.  I dissected down onto   the anterior third of the L4-5 disk space when I swept in the musculature   posteriorly.  The intradiscal theresa was docked into the posterior blade, which   was docked at the 90-yard line anteriorly.  Next, I was able to visualize the   disk space.  An annulotomy was made.  Very thorough diskectomy and   cartilaginous endplate in preparation was achieved.  An annular release was   achieved.  I placed a _____x22mmx55mm in length 4WEB titanium truss interbody   cage packed with NanoBone. This gently tapped into place.  Next, I secured a   16 mm 2-hole plate.  I placed a 55 mm screws.  The locking mechanism was   engaged.  AP and lateral fluoroscopy demonstrated perfect placement of   hardware.  Perfect hemostasis was achieved.  The wound was closed in anatomic   layers and a sterile dressing was applied.  Please note, I will bring the   patient back separately for an L2-L5 laminectomy, instrumentation and fusion   if he does not get complete relief of his symptoms.        ______________________________  MD NATASHA SOSA/NOLA    DD:  02/22/2021 20:16  DT:  02/22/2021 21:05    Job#:  [id number]

## 2021-02-23 NOTE — ANESTHESIA PREPROCEDURE EVALUATION
Relevant Problems   No relevant active problems       Physical Exam    Airway   Mallampati: II  TM distance: >3 FB  Neck ROM: full       Cardiovascular - normal exam  Rhythm: regular  Rate: normal  (-) murmur     Dental - normal exam           Pulmonary - normal exam  Breath sounds clear to auscultation     Abdominal    Neurological - normal exam                 Anesthesia Plan    ASA 3   ASA physical status 3 criteria: diabetes - poorly controlled, hypertension - poorly controlled and COPD    Plan - general       Airway plan will be ETT          Induction: intravenous    Postoperative Plan: Postoperative administration of opioids is intended.    Pertinent diagnostic labs and testing reviewed    Informed Consent:    Anesthetic plan and risks discussed with patient.    Use of blood products discussed with: patient whom consented to blood products.

## 2021-02-23 NOTE — PROGRESS NOTES
"Neurosurgery Progress Note    Subjective:  Doing well, states \"everything feels better\"  Ambulatory  Voiding  Not passing gas   Pain well controlled on PCA  States LE pain is gone but has pain in L IP distribution c/w surgery  Denies new pain, numbness, weakness.   Denies HA, positional HA, N/V, dizziness, chest pain, SOB, difficulty breathing, chills      Exam:  VSS  A&Ox4, NAD  NM: 4/4 hip flexion, knee extension, pain limited but c/w prior to surgery  Fasciculations in L quad with knee ext same as prior to surgery  5/5  knee flexion, plantar flexion, dorsiflexion, EHL  Sensation intact and equal throughout all four extremities.   Abdomen: soft, TTP around incision, non-rigid   Non-labored breathing on room air, normal respiratory effort  Capillary refill in all four extremities <2 seconds  No LE edema, erythema, cyanosis, clubbing  Calves non-tender to compression bilat  Incision CDI    BP  Min: 81/40  Max: 152/77  Pulse  Av.1  Min: 79  Max: 105  Resp  Av.4  Min: 14  Max: 22  Temp  Av.4 °C (97.5 °F)  Min: 35.9 °C (96.7 °F)  Max: 37.1 °C (98.8 °F)  SpO2  Av.1 %  Min: 93 %  Max: 100 %    No data recorded    Recent Labs     21  0932   WBC 15.8*   RBC 4.06*   HEMOGLOBIN 11.2*   HEMATOCRIT 35.8*   MCV 88.2   MCH 27.6   MCHC 31.3*   RDW 46.6   PLATELETCT 392   MPV 9.0     Recent Labs     21  0932   SODIUM 132*   POTASSIUM 4.9   CHLORIDE 100   CO2 25   GLUCOSE 136*   BUN 21   CREATININE 1.55*   CALCIUM 9.6               Intake/Output       21 - 21 0659 21 - 21 0659      [id number] Total  Total       Intake    P.O.  --  -- --  200  -- 200    P.O. -- -- -- 200 -- 200    I.V.  --  1853.1 1853.1  42.9  -- 42.9    PCA End of Shift Total Volume (ml) -- -- -- 42.9 -- 42.9    Phenylephrine Volume -- 3 3 -- -- --    Propofol Volume -- 350.1 350.1 -- -- --    Volume (mL) (Lactated Ringers) -- 1500 1500 -- -- --    Total Intake -- 1853.1 " 1853.1 242.9 -- 242.9       Output    Urine  --  400 400  325  -- 325    Number of Times Voided -- -- -- 2 x -- 2 x    Urine Void (mL) -- -- -- 325 -- 325    Output (mL) ([REMOVED] Urethral Catheter Non-latex 16 Fr.) -- 400 400 -- -- --    Blood  --  30 30  --  -- --    Est. Blood Loss -- 30 30 -- -- --    Total Output -- 430 430 325 -- 325       Net I/O     -- 1423.1 1423.1 -82.1 -- -82.1            Intake/Output Summary (Last 24 hours) at 2/23/2021 1118  Last data filed at 2/23/2021 1052  Gross per 24 hour   Intake 2095.96 ml   Output 755 ml   Net 1340.96 ml            • oxyCODONE immediate-release  10 mg Q4HRS PRN   • oxyCODONE CR  20 mg DAILY   • Pharmacy Consult Request  1 Each PHARMACY TO DOSE   • MD ALERT...DO NOT ADMINISTER NSAIDS or ASPIRIN unless ORDERED By Neurosurgery  1 Each PRN   • docusate sodium  100 mg BID   • senna-docusate  1 tablet Nightly   • senna-docusate  1 tablet Q24HRS PRN   • polyethylene glycol/lytes  1 Packet BID PRN   • magnesium hydroxide  30 mL QDAY PRN   • bisacodyl  10 mg Q24HRS PRN   • fleet  1 Each Once PRN   • NS   Continuous   • ondansetron  4 mg Q4HRS PRN   • ondansetron  4 mg Q4HRS PRN   • diphenhydrAMINE  25 mg Q6HRS PRN    Or   • diphenhydrAMINE  25 mg Q6HRS PRN   • cyclobenzaprine  10 mg Q8HRS PRN   • labetalol  10 mg Q HOUR PRN   • vitamin D  5,000 Units DAILY       Assessment and Plan:  POD #1 L2-L5 OLIF with plates  Doing well  Wean PCA  Ambulate, work with therapies  Continue incentive spirometry Q1H  Continue pain control  Continue stool softeners to encourage BM  Advance diet once passing gas - d/w RN  Ordered standing AP and lateral XR  Will need 2 week post op with AP and lateral standing XR     Prophylactic anticoagulation: yes         Start date/time: lovenox OK tomorrow AM given late procedure.     Patient agrees with treatment plan.   Case discussed with Dr. Lee.

## 2021-02-23 NOTE — PROGRESS NOTES
2 RN Skin Check    2 RN skin check complete with RONY Dhillon.   Devices in place: SCDs and Nasal Cannula.  Skin assessed under devices: yes.  Scratch located on lower left calf. Sx incision and dressing to left hip.   Confirmed pressure ulcers found on: n/a.  New potential pressure ulcers noted on n/a. Wound consult placed N/A.  The following interventions in place Pillows and SCDs.

## 2021-02-23 NOTE — ANESTHESIA POSTPROCEDURE EVALUATION
Patient: Harrison Gan    Procedure Summary     Date: 02/22/21 Room / Location: [facility] 07 / SURGERY [facility]    Anesthesia Start: 1719 Anesthesia Stop: 2023    Procedures:       FUSION, SPINE, LUMBAR, INTERBODY, OBLIQUE APPROACH - L2-5 (Left Spine Lumbar)      FUSION, SPINE, LUMBAR, PLIF - L2-5 W/INST (N/A Spine Lumbar)      LAMINECTOMY, SPINE, LUMBAR, WITH DISCECTOMY. (N/A Spine Lumbar) Diagnosis: (LUMBAR STENOSIS)    Surgeons: Michael Lee M.D. Responsible Provider: Yonas Dempsey M.D.    Anesthesia Type: general ASA Status: 3          Final Anesthesia Type: general  Last vitals  BP   Blood Pressure : 131/62    Temp   36.2 °C (97.2 °F)    Pulse   (!) 105   Resp   16    SpO2   96 %      Anesthesia Post Evaluation    Patient location during evaluation: PACU  Patient participation: complete - patient participated  Level of consciousness: awake and alert  Pain score: 4    Airway patency: patent  Anesthetic complications: no  Cardiovascular status: hemodynamically stable  Respiratory status: acceptable  Hydration status: euvolemic    PONV: none          No complications documented.     Nurse Pain Score: 1 (NPRS)

## 2021-02-23 NOTE — THERAPY
Occupational Therapy   Initial Evaluation     Patient Name: Harrison Gan  Age:  70 y.o., Sex:  male  Medical Record #: [id number]  Today's Date: 2/23/2021     Precautions  Precautions: Fall Risk, TLSO (Thoracolumbosacral orthosis), Spinal / Back Precautions   Comments: LSO when OOB > 5 mins    Assessment  Patient is 70 y.o. male POD#1 L2-L5 with plates. Pt presents to OT iva with generalized weakness and pain affecting his independence with ADLs, ADL transfers, and functional mobility. Pt reports he needs assist at baseline for showering, LB dressing, and toileting. Today, pt needed maxA for LB dressing, spv for toilet transfer with raised commode over toilet, and setup for seated grooming/hygiene. Pt needed intermittent verbal cues to maintain spinal precautions during bed mobility and would benefit from continued acute OT tx to address spinal precautions and compensatory strategies for ADLs. Pt educated on spinal precautions and log roll technique, and was provided with a handout, but would benefit from reinforcement on all topics. Recommend home health for continued occupational therapy services.     Plan    Recommend Occupational Therapy 3 times per week until therapy goals are met for the following treatments:  Adaptive Equipment, Manual Therapy Techniques, Neuro Re-Education / Balance, Self Care/Activities of Daily Living, Therapeutic Activities and Therapeutic Exercises.    DC Equipment Recommendations: Tub / Shower Seat  Discharge Recommendations: Recommend home health for continued occupational therapy services     Subjective    Pt alert, pleasant, and cooperative with OT iva.      Objective       02/23/21 1012   Prior Living Situation   Prior Services Intermittent Physical Support for ADL Per Family   Housing / Facility 1 Story House   Steps Into Home 2   Steps In Home 0   Bathroom Set up Walk In Shower  (built in bench, hard for him to sit on)   Equipment Owned Raised Toilet Seat Without  Arms;4-Wheel Walker;Grab Bar(s) In Tub / Shower;Tub / Shower Seat   Lives with - Patient's Self Care Capacity Spouse   Comments Pt lives with spouse who is retired and able to assist with ADLs/IADLs.   Prior Level of ADL Function   Self Feeding Independent   Grooming / Hygiene Independent   Bathing Requires Assist   Dressing Requires Assist   Toileting Requires Assist   Comments Requires assist from spouse at baseline for showering, LB dressing, and pericare.   Prior Level of IADL Function   Medication Management Requires Assist   Laundry Requires Assist   Kitchen Mobility Requires Assist   Finances Requires Assist   Home Management Requires Assist   Shopping Requires Assist   Prior Level Of Mobility Supervision With Device in Home   Driving / Transportation Relatives / Others Provide Transportation   Occupation (Pre-Hospital Vocational) Retired Due To Disability;Retired Due To Age   Leisure Interests Unable To Determine At This Time   Cognition    Cognition / Consciousness WDL   Level of Consciousness Alert   Comments pleasant, cooperative, intermittent cues to maintain spinal precautions   Balance Assessment   Comments seated at SBA, standing with Kiera using FWW, no overt LOB   Bed Mobility    Supine to Sit Minimal Assist   Comments HOB slightly elevated, use of bed rails, reports he has bed rails at home on flat bed   ADL Assessment   Grooming Supervision;Seated   Lower Body Dressing Maximal Assist  (socks)   Toileting   (declined need)   Comments TLSO not present in room, son to bring from [place]   Functional Mobility   Sit to Stand Supervised   Toilet Transfers Supervised  (BSC placed over toilet)   Transfer Method Stand Step   Mobility in room/bathroom with FWW   Distance (Feet) 20   # of Times Distance was Traveled 1   Patient / Family Goals   Patient / Family Goal #1 To get better   Short Term Goals   Short Term Goal # 1 Pt will complete LB dressing with Kiera using AE by discharge.   Short Term Goal # 2  Pt will don TLSO with Kiera by discharge.   Short Term Goal # 3 Pt will complete standing grooming/hygiene with spv with no verbal cues to maintain spinal precautions by discharge.

## 2021-02-23 NOTE — OR NURSING
"Patient recovered well in post-op. AAO SHANTELL, when asked any questions patient repeats \"go away\". VSS, on 4L via oxymask.. Surgical sites SHANTELL, surgical dressing in place CDI. Surgical pain managed through rest and ice pack. When offered pain medication, patient continued to repeat \"go away\" and refused offered pain medication. BLE warm/pink, cap refill < 3 seconds, sensation SHANTELL, bilateral pedal pulses 2+, LLE strengeth 3/5, RLE strength 4/5. Patient refused offered oral fluids. Steele in place, patient making clear/yellow urine. Spouse updated and discussed POC. Patient belongings retrieved and on gurney including brown satchel, 2 personal belongings bags, and a cane. Report called to Shirin. Awaiting transport. O2 tank 1/2 full for transport.   "

## 2021-02-23 NOTE — ANESTHESIA PROCEDURE NOTES
Airway    Date/Time: 2/22/2021 5:26 PM  Performed by: Yonas Dempsey M.D.  Authorized by: Yonas Dempsey M.D.     Location:  OR  Urgency:  Elective  Difficult Airway: No    Indications for Airway Management:  Anesthesia      Spontaneous Ventilation: absent    Sedation Level:  Deep  Preoxygenated: Yes    Patient Position:  Sniffing  Final Airway Type:  Endotracheal airway  Final Endotracheal Airway:  ETT  Cuffed: Yes    Technique Used for Successful ETT Placement:  Direct laryngoscopy    Insertion Site:  Oral  Blade Type:  Lawrence  Laryngoscope Blade/Videolaryngoscope Blade Size:  3  ETT Size (mm):  8.0  Measured from:  Lips  ETT to Lips (cm):  24  Placement Verified by: auscultation and capnometry    Cormack-Lehane Classification:  Grade IIb - view of arytenoids or posterior of glottis only  Number of Attempts at Approach:  1  Number of Other Approaches Attempted:  0

## 2021-02-24 LAB
ANION GAP SERPL CALC-SCNC: 14 MMOL/L (ref 7–16)
BUN SERPL-MCNC: 21 MG/DL (ref 8–22)
CALCIUM SERPL-MCNC: 9.9 MG/DL (ref 8.5–10.5)
CHLORIDE SERPL-SCNC: 100 MMOL/L (ref 96–112)
CO2 SERPL-SCNC: 22 MMOL/L (ref 20–33)
CREAT SERPL-MCNC: 1.33 MG/DL (ref 0.5–1.4)
ERYTHROCYTE [DISTWIDTH] IN BLOOD BY AUTOMATED COUNT: 45.3 FL (ref 35.9–50)
GLUCOSE SERPL-MCNC: 129 MG/DL (ref 65–99)
HCT VFR BLD AUTO: 34.2 % (ref 42–52)
HGB BLD-MCNC: 11.1 G/DL (ref 14–18)
MCH RBC QN AUTO: 27.8 PG (ref 27–33)
MCHC RBC AUTO-ENTMCNC: 32.5 G/DL (ref 33.7–35.3)
MCV RBC AUTO: 85.7 FL (ref 81.4–97.8)
PLATELET # BLD AUTO: 362 K/UL (ref 164–446)
PMV BLD AUTO: 9.2 FL (ref 9–12.9)
POTASSIUM SERPL-SCNC: 3.8 MMOL/L (ref 3.6–5.5)
RBC # BLD AUTO: 3.99 M/UL (ref 4.7–6.1)
SODIUM SERPL-SCNC: 136 MMOL/L (ref 135–145)
WBC # BLD AUTO: 14.6 K/UL (ref 4.8–10.8)

## 2021-02-24 PROCEDURE — 770006 HCHG ROOM/CARE - MED/SURG/GYN SEMI*

## 2021-02-24 PROCEDURE — 700111 HCHG RX REV CODE 636 W/ 250 OVERRIDE (IP): Performed by: STUDENT IN AN ORGANIZED HEALTH CARE EDUCATION/TRAINING PROGRAM

## 2021-02-24 PROCEDURE — 36415 COLL VENOUS BLD VENIPUNCTURE: CPT

## 2021-02-24 PROCEDURE — 80048 BASIC METABOLIC PNL TOTAL CA: CPT

## 2021-02-24 PROCEDURE — 85027 COMPLETE CBC AUTOMATED: CPT

## 2021-02-24 PROCEDURE — A9270 NON-COVERED ITEM OR SERVICE: HCPCS | Performed by: PHYSICIAN ASSISTANT

## 2021-02-24 PROCEDURE — 700102 HCHG RX REV CODE 250 W/ 637 OVERRIDE(OP): Performed by: PHYSICIAN ASSISTANT

## 2021-02-24 RX ADMIN — DOCUSATE SODIUM 100 MG: 100 CAPSULE, LIQUID FILLED ORAL at 04:39

## 2021-02-24 RX ADMIN — OXYCODONE HYDROCHLORIDE 10 MG: 10 TABLET ORAL at 21:12

## 2021-02-24 RX ADMIN — OXYCODONE HYDROCHLORIDE 10 MG: 10 TABLET ORAL at 12:58

## 2021-02-24 RX ADMIN — OXYCODONE HYDROCHLORIDE 20 MG: 20 TABLET, FILM COATED, EXTENDED RELEASE ORAL at 17:22

## 2021-02-24 RX ADMIN — ENOXAPARIN SODIUM 30 MG: 30 INJECTION SUBCUTANEOUS at 12:21

## 2021-02-24 RX ADMIN — OXYCODONE HYDROCHLORIDE 20 MG: 20 TABLET, FILM COATED, EXTENDED RELEASE ORAL at 04:39

## 2021-02-24 RX ADMIN — DOCUSATE SODIUM 100 MG: 100 CAPSULE, LIQUID FILLED ORAL at 17:22

## 2021-02-24 RX ADMIN — DOCUSATE SODIUM 50 MG AND SENNOSIDES 8.6 MG 1 TABLET: 8.6; 5 TABLET, FILM COATED ORAL at 21:12

## 2021-02-24 RX ADMIN — CYCLOBENZAPRINE 10 MG: 10 TABLET, FILM COATED ORAL at 21:12

## 2021-02-24 RX ADMIN — ENOXAPARIN SODIUM 30 MG: 30 INJECTION SUBCUTANEOUS at 21:12

## 2021-02-24 RX ADMIN — CYCLOBENZAPRINE 10 MG: 10 TABLET, FILM COATED ORAL at 12:58

## 2021-02-24 RX ADMIN — Medication 5000 UNITS: at 04:39

## 2021-02-24 ASSESSMENT — PAIN DESCRIPTION - PAIN TYPE
TYPE: SURGICAL PAIN
TYPE: SURGICAL PAIN
TYPE: ACUTE PAIN;SURGICAL PAIN
TYPE: SURGICAL PAIN

## 2021-02-24 NOTE — CARE PLAN
Problem: Safety  Goal: Will remain free from injury  Outcome: PROGRESSING AS EXPECTED  Goal: Will remain free from falls  Outcome: PROGRESSING AS EXPECTED     Problem: Pain Management  Goal: Pain level will decrease to patient's comfort goal  Outcome: PROGRESSING AS EXPECTED  PRN pain meds in use     Problem: Mobility  Goal: Risk for activity intolerance will decrease  Outcome: PROGRESSING AS EXPECTED   Use of a fWW to ambulate

## 2021-02-24 NOTE — PROGRESS NOTES
Neurosurgery Progress Note    Subjective:  Doing well  C/o pain in low back and incisional pain  Ambulatory  Voiding  Denies new pain, numbness, weakness.   Denies HA, positional HA, N/V, dizziness, chest pain, SOB, difficulty breathing, chills      Exam:  VSS  A&Ox4, NAD  NM: 4/4 hip flexion, knee extension, pain limited but c/w prior to surgery  Fasciculations in L quad with knee ext same as prior to surgery  5/5  knee flexion, plantar flexion, dorsiflexion, EHL  Sensation intact and equal throughout all four extremities.   Abdomen: soft, TTP around incision, non-rigid   Non-labored breathing on room air, normal respiratory effort  Capillary refill in all four extremities <2 seconds  No LE edema, erythema, cyanosis, clubbing  Calves non-tender to compression bilat  Incision CDI    BP  Min: 112/65  Max: 149/76  Pulse  Av.2  Min: 63  Max: 109  Resp  Av.2  Min: 17  Max: 18  Temp  Av.8 °C (98.3 °F)  Min: 36.4 °C (97.5 °F)  Max: 37.4 °C (99.3 °F)  SpO2  Av.2 %  Min: 92 %  Max: 96 %    No data recorded    Recent Labs     21  0932   WBC 15.8*   RBC 4.06*   HEMOGLOBIN 11.2*   HEMATOCRIT 35.8*   MCV 88.2   MCH 27.6   MCHC 31.3*   RDW 46.6   PLATELETCT 392   MPV 9.0     Recent Labs     21  0932   SODIUM 132*   POTASSIUM 4.9   CHLORIDE 100   CO2 25   GLUCOSE 136*   BUN 21   CREATININE 1.55*   CALCIUM 9.6               Intake/Output       21 07 - 21 0659 21 07 - 21 0659       Total  Total       Intake    P.O.  440  -- 440  300  -- 300    P.O. 440 -- 440 300 -- 300    I.V.  59.1  -- 59.1  --  -- --    PCA End of Shift Total Volume (ml) 59.1 -- 59.1 -- -- --    Total Intake 499.1 -- 499.1 300 -- 300       Output    Urine  625  600 1225  --  -- --    Number of Times Voided 2 x 3 x 5 x 2 x -- 2 x    Urine Void (mL)  -- -- --    Total Output  -- -- --       Net I/O     -394 -328 -923 300 -- 300             Intake/Output Summary (Last 24 hours) at 2/24/2021 1058  Last data filed at 2/24/2021 0900  Gross per 24 hour   Intake 556.15 ml   Output 900 ml   Net -343.85 ml            • oxyCODONE immediate-release  10 mg Q4HRS PRN   • oxyCODONE CR  20 mg BID   • Pharmacy Consult Request  1 Each PHARMACY TO DOSE   • MD ALERT...DO NOT ADMINISTER NSAIDS or ASPIRIN unless ORDERED By Neurosurgery  1 Each PRN   • docusate sodium  100 mg BID   • senna-docusate  1 tablet Nightly   • senna-docusate  1 tablet Q24HRS PRN   • polyethylene glycol/lytes  1 Packet BID PRN   • magnesium hydroxide  30 mL QDAY PRN   • bisacodyl  10 mg Q24HRS PRN   • fleet  1 Each Once PRN   • NS   Continuous   • ondansetron  4 mg Q4HRS PRN   • ondansetron  4 mg Q4HRS PRN   • diphenhydrAMINE  25 mg Q6HRS PRN    Or   • diphenhydrAMINE  25 mg Q6HRS PRN   • cyclobenzaprine  10 mg Q8HRS PRN   • labetalol  10 mg Q HOUR PRN   • vitamin D  5,000 Units DAILY       Assessment and Plan:  POD #2 L2-L5 OLIF with plates  Doing well  Wean PCA  Ambulate, work with therapies  Therapies recommend home health and front wheel walker    Continue incentive spirometry Q1H  Continue pain control  Continue stool softeners to encourage BM  Advance diet once passing gas   Standing AP and lateral XR reviewed  Will need 2 week post op with AP and lateral standing XR     Prophylactic anticoagulation: yes         Start date/time: lovenox     Patient agrees with treatment plan.   Case discussed with Dr. Lee.

## 2021-02-24 NOTE — CARE PLAN
Problem: Urinary Elimination:  Goal: Ability to reestablish a normal urinary elimination pattern will improve  Outcome: PROGRESSING AS EXPECTED  Patient's urethral catheter was removed yesterday, patient is voiding without complications. Patient using urinal at bedside or ambulates with FWW to bathroom.     Problem: Venous Thromboembolism (VTW)/Deep Vein Thrombosis (DVT) Prevention:  Goal: Patient will participate in Venous Thrombosis (VTE)/Deep Vein Thrombosis (DVT)Prevention Measures  Outcome: PROGRESSING AS EXPECTED  Patient being administered Lovenox and is wearing bilateral SCDs. Patient ambulating with FWW. Patient has worked with PT/OT.

## 2021-02-24 NOTE — PROGRESS NOTES
One transporter arrived at bedside with gurney to transport patient to X-ray. Patient ambulated from hospital bed to [facility] with FWW without assist.

## 2021-02-24 NOTE — FACE TO FACE
Face to Face Note  -  Durable Medical Equipment    Germaine Sutton P.A.-C. - NPI: [id number]  I certify that this patient is under my care and that they have had a durable medical equipment(DME)face to face encounter by myself that meets the physician DME face-to-face encounter requirements with this patient on:    Date of encounter:   Patient:                    MRN:                       YOB: 2021  Harrison Gan  [id number]  1950     The encounter with the patient was in whole, or in part, for the following medical condition, which is the primary reason for durable medical equipment:  Post-Op Surgery    I certify that, based on my findings, the following durable medical equipment is medically necessary:  Walkers.    HOME O2 Saturation Measurements:(Values must be present for Home Oxygen orders)         ,     ,         My Clinical findings support the need for the above equipment due to:  Other - Post surgical weakness    Supporting Symptoms: Low back pain, lower extremity weakness     ------------------------------------------------------------------------------------------------------------------    Face to Face Supporting Documentation - Home Health    The encounter with this patient was in whole or in part the primary reason for home health admission.    Date of encounter:   Patient:                    MRN:                       YOB: 2021  Harrison Gan  [id number]  1950     Home health to see patient for:  Home health aide, Physical Therapy evaluation and treatment and Occupational therapy evaluation and treatment    Skilled need for:  Surgical Aftercare wound care and physical therapy    Skilled nursing interventions to include:  Wound Care    Homebound evidenced status by:  Need the aid of supportive devices such as crutches, canes, wheelchairs or walkers. Leaving home must require a considerable and taxing effort. There must exist a  normal inability to leave the home.    Community Physician to provide follow up care: Dung Bar M.D.     Optional Interventions    Wound information & treatment:    Home Infusion Therapy orders:    Line/Drain/Airway:    I certify the face to face encounter for this home care referral meets the CMS requirements and the encounter/clinical assessment with the patient was, in whole, or in part, for the medical condition(s) listed above, which is the primary reason for home health care. Based on my clinical findings: the service(s) are medically necessary, support the need for home health care, and the homebound criteria are met.  I certify that this patient has had a face to face encounter by myself.  Germaine Sutton P.A.-C. - NPI: [id number]    *Debility, frailty and advanced age in the absence of an acute deterioration or exacerbation of a condition do not qualify a patient for home health.

## 2021-02-24 NOTE — PROGRESS NOTES
Patient returned from X-ray. Patient fatigued and unable to transfer self from [facility] to bed. Patient moved from [facility] to bed via draw sheet with 3 staff members (1 RN, 1 CNA, 1 transporter).

## 2021-02-25 PROCEDURE — A9270 NON-COVERED ITEM OR SERVICE: HCPCS | Performed by: PHYSICIAN ASSISTANT

## 2021-02-25 PROCEDURE — 700105 HCHG RX REV CODE 258: Performed by: PHYSICIAN ASSISTANT

## 2021-02-25 PROCEDURE — 97535 SELF CARE MNGMENT TRAINING: CPT

## 2021-02-25 PROCEDURE — 700111 HCHG RX REV CODE 636 W/ 250 OVERRIDE (IP): Performed by: PHYSICIAN ASSISTANT

## 2021-02-25 PROCEDURE — 700111 HCHG RX REV CODE 636 W/ 250 OVERRIDE (IP): Performed by: STUDENT IN AN ORGANIZED HEALTH CARE EDUCATION/TRAINING PROGRAM

## 2021-02-25 PROCEDURE — 770006 HCHG ROOM/CARE - MED/SURG/GYN SEMI*

## 2021-02-25 PROCEDURE — 700102 HCHG RX REV CODE 250 W/ 637 OVERRIDE(OP): Performed by: PHYSICIAN ASSISTANT

## 2021-02-25 RX ORDER — METHOCARBAMOL 750 MG/1
750 TABLET, FILM COATED ORAL 3 TIMES DAILY
Status: DISCONTINUED | OUTPATIENT
Start: 2021-02-25 | End: 2021-02-26 | Stop reason: HOSPADM

## 2021-02-25 RX ADMIN — METHOCARBAMOL 1000 MG: 100 INJECTION INTRAMUSCULAR; INTRAVENOUS at 10:16

## 2021-02-25 RX ADMIN — OXYCODONE HYDROCHLORIDE 10 MG: 10 TABLET ORAL at 20:16

## 2021-02-25 RX ADMIN — OXYCODONE HYDROCHLORIDE 20 MG: 20 TABLET, FILM COATED, EXTENDED RELEASE ORAL at 18:03

## 2021-02-25 RX ADMIN — ENOXAPARIN SODIUM 30 MG: 30 INJECTION SUBCUTANEOUS at 10:02

## 2021-02-25 RX ADMIN — OXYCODONE HYDROCHLORIDE 10 MG: 10 TABLET ORAL at 14:41

## 2021-02-25 RX ADMIN — METHOCARBAMOL 750 MG: 750 TABLET ORAL at 18:03

## 2021-02-25 RX ADMIN — OXYCODONE HYDROCHLORIDE 20 MG: 20 TABLET, FILM COATED, EXTENDED RELEASE ORAL at 06:14

## 2021-02-25 RX ADMIN — DOCUSATE SODIUM 100 MG: 100 CAPSULE, LIQUID FILLED ORAL at 06:14

## 2021-02-25 RX ADMIN — OXYCODONE HYDROCHLORIDE 10 MG: 10 TABLET ORAL at 10:02

## 2021-02-25 RX ADMIN — DOCUSATE SODIUM 50 MG AND SENNOSIDES 8.6 MG 1 TABLET: 8.6; 5 TABLET, FILM COATED ORAL at 20:16

## 2021-02-25 RX ADMIN — ENOXAPARIN SODIUM 30 MG: 30 INJECTION SUBCUTANEOUS at 20:16

## 2021-02-25 RX ADMIN — Medication 5000 UNITS: at 06:14

## 2021-02-25 RX ADMIN — DOCUSATE SODIUM 100 MG: 100 CAPSULE, LIQUID FILLED ORAL at 18:03

## 2021-02-25 ASSESSMENT — COGNITIVE AND FUNCTIONAL STATUS - GENERAL
HELP NEEDED FOR BATHING: A LOT
DRESSING REGULAR LOWER BODY CLOTHING: A LOT
DRESSING REGULAR UPPER BODY CLOTHING: A LITTLE
DAILY ACTIVITIY SCORE: 18
TOILETING: A LITTLE
SUGGESTED CMS G CODE MODIFIER DAILY ACTIVITY: CK

## 2021-02-25 ASSESSMENT — PAIN DESCRIPTION - PAIN TYPE
TYPE: ACUTE PAIN;SURGICAL PAIN
TYPE: SURGICAL PAIN
TYPE: ACUTE PAIN;SURGICAL PAIN
TYPE: SURGICAL PAIN;ACUTE PAIN

## 2021-02-25 NOTE — DISCHARGE PLANNING
[facility]    [address] [facility] [address]    Phone:  [phone]    Fax:  [phone]       Thank You for choosing us for your health care visit. We are glad to serve you and happy to provide you with this summary of your visit. Please help us to ensure we have accurate records. If you find anything that needs to be changed, please let our staff know as soon as possible.          Your Demographic Information     Patient Name Sex Judy Eisenberg Female 1978       Ethnic Group Patient Race    Not of  or  Origin White      Your Visit Details     Date & Time Provider Department    3/28/2017 2:00 PM Jb Frey MD [facility]      Your Upcoming Appointment*(Max 10)     2017  1:00 PM CDT   Post-Op Visit with Jb Frey MD   [facility] ([facility])    [address] [place] [id number]   [phone]              Conditions Discussed Today or Order-Related Diagnoses        Comments    Femoral acetabular impingement         Acetabular labrum tear, right, subsequent encounter           Your Vitals Were     Smoking Status                   Never Smoker           Medications Prescribed or Re-Ordered Today     aspirin (ECOTRIN) 325 MG EC tablet    Sig - Route: Take 1 tablet by mouth daily. X 4 weeks for DVT prophylaxis - Oral    Class: Normal    Pharmacy: [facility]/pharmacy #[id number]  [place] - [address] [facility] Ph #: [phone]    ketorolac (TORADOL) 10 MG tablet    Sig - Route: Take 1 tablet by mouth every 6 hours. X 2 days - Oral    Class: Normal    Pharmacy: [facility]/pharmacy #[id number]  [place] - [address] [facility] Ph #: [phone]    oxyCODONE/APAP (PERCOCET) 5-325 MG per tablet    Sig - Route: Take 1-2 tablets by mouth every 6 hours as needed for Pain (Breakthrough pain). - Oral    Class: Normal    Pharmacy: [facility]/pharmacy #[id number] - [place] - [address]  Anticipated Discharge Disposition: Home with FWW    Action: Received call from Ying at [facility] , they are the contracted TPA for Workers Comp. She has approved [employer] to dispense FWW for home. Bedside RN will notify  [employer]. [facility] will work on HH. It is doubltful that they will be able to arrange HH as the only provider contracts with [facility] exclusively.    Barriers to Discharge: Lack of HH provider.    Plan: Discharge to home tomorrow with possible Outpt PT.   [facility] Ph #: [phone]    morphine SR (MS CONTIN) 15 MG 12 hr tablet    Sig - Route: Take 1 tablet by mouth every 12 hours. x 2 weeks for pain - Oral    Class: Normal    Pharmacy: [facility]/pharmacy #[id number] [facility] [address] [facility] Ph #: [phone]    diclofenac (VOLTAREN) 75 MG EC tablet    Sig - Route: Take 1 tablet by mouth 2 times daily. Take for 1 month, to start after Toradol is complete. - Oral    Class: Normal    Pharmacy: [facility]/pharmacy #[id number] [facility] [address] [facility] Ph #: [phone]      Your Current Medications Are        Disp Refills Start End    TraMADol HCl 50 MG TABLET DISPERSIBLE 30 tablet 0 3/22/2017     Sig - Route: Take 50 mg by mouth 3 times daily. - Oral    montelukast (SINGULAIR) 10 MG tablet 90 tablet 1 2/13/2017     Sig: TAKE 1 TABLET BY MOUTH EVERY EVENING    Class: Eprescribe    citalopram (CELEXA) 20 MG tablet        Sig - Route: Take 20 mg by mouth daily. - Oral    Class: Historical Med    levonorgestrel-ethinyl estradiol (LEVORA 0.15/30, 28,) 0.15-30 MG-MCG per tablet        Sig - Route: Take 1 tablet by mouth daily. - Oral    Class: Historical Med    Loratadine (CLARITIN PO)        Sig - Route: Take  by mouth. - Oral    Class: Historical Med    aspirin (ECOTRIN) 325 MG EC tablet 30 tablet 0 3/28/2017     Sig - Route: Take 1 tablet by mouth daily. X 4 weeks for DVT prophylaxis - Oral    ketorolac (TORADOL) 10 MG tablet 12 tablet 0 3/28/2017     Sig - Route: Take 1 tablet by mouth every 6 hours. X 2 days - Oral    oxyCODONE/APAP (PERCOCET) 5-325 MG per tablet 30 tablet 0 3/28/2017     Sig - Route: Take 1-2 tablets by mouth every 6 hours as needed for Pain (Breakthrough pain). - Oral    morphine SR (MS CONTIN) 15 MG 12 hr tablet 15 tablet 0 3/28/2017     Sig - Route: Take 1 tablet by mouth every 12 hours. x 2 weeks for pain - Oral    diclofenac (VOLTAREN) 75 MG EC tablet 60 tablet 0 3/28/2017     Sig - Route: Take 1 tablet by mouth 2 times daily. Take  for 1 month, to start after Toradol is complete. - Oral      Allergies     Bee Sting ANAPHYLAXIS    Latex     Lobster RASH    Penicillin G     Sulfa Antibiotics       Immunizations History as of 3/28/2017     Name Date    Influenza 10/1/2016      Problem List as of 3/28/2017     Femoral acetabular impingement    Acetabular labrum tear            Patient Instructions     None

## 2021-02-25 NOTE — DISCHARGE PLANNING
Anticipated Discharge Disposition: Home with DME    Action: Has order for HH and FWW. Pt lives in [place], the only HH agency serving that area is [facility] and they only accept [facility] insurance. Have 's Comp FELIPE Marroquin  and left voicemail message requesting return call.     Barriers to Discharge: No HH available in his area and pending direction of DME prvider.    Plan: F/U with Worker's [employer] FELIPE

## 2021-02-25 NOTE — CARE PLAN
Problem: Respiratory:  Goal: Respiratory status will improve  Outcome: PROGRESSING AS EXPECTED  Patient educated and agrees to use incentive spirometer on an hourly basis to improve respiratory function. Patient also agrees to attempt ambulation 3 times per day. Patient performs effective return demonstration of TCDB.      Problem: Fluid Volume:  Goal: Will maintain balanced intake and output  Outcome: PROGRESSING AS EXPECTED  Patient receiving adequate oral intake. Patient has no issues voiding.

## 2021-02-25 NOTE — THERAPY
Occupational Therapy  Daily Treatment     Patient Name: Harrison Gan  Age:  70 y.o., Sex:  male  Medical Record #: [id number]  Today's Date: 2/25/2021     Precautions  Precautions: Fall Risk, Lumbosacral Orthosis, Spinal / Back Precautions   Comments: LSO when OOB >5 mins    Assessment    Pt continues to require verbal cues for safety during ADLs and ADL transfers, for hand placement during transfers and maintaining FWW in bathroom. Pt unable to recall any spinal precautions at beginning or end of session despite education, therefore recommend continued education/reinforcement in future sessions. Pt required several verbal cues to keep LSO on when OOB despite education. Recommend home health for continued occupational therapy services.     Plan    Continue current treatment plan with updated treatment goals.    DC Equipment Recommendations: Tub / Shower Seat  Discharge Recommendations: Recommend home health for continued occupational therapy services    Subjective    Pt alert and cooperative. Pt reports 4/10 back pain during mobility.      Objective       02/25/21 1512   Cognition    Level of Consciousness Alert   Comments cooperative, cues for safety, no carryover of learning from previous session or even from start of session to end   Other Treatments   Other Treatments Provided Education on spinal precautions and wear schedule of brace. Pt unable to recall spinal precautions at beginning of session and after education and was quizzed again at end of session, still unable to recall any. Pt continuously attempts to take LSO off despite education on doctor's orders to wear when OOB > 5 mins. Would benefit from continued reinforcement on all topics.   Balance   Comments cues to maintain FWW in bathroom and to reach for armrests when sitting/standing   Bed Mobility    Supine to Sit Supervised   Comments via log roll with no verbal cues   Activities of Daily Living   Grooming Supervision;Standing  (washing hands)    Upper Body Dressing Minimal Assist  (to don brace)   Toileting Supervision  (standing)   Functional Mobility   Sit to Stand Supervised   Bed, Chair, Wheelchair Transfer Supervised   Mobility in room/bathroom with FWW   Comments cues for hand placement during transfers   Activity Tolerance   Comments limited by pain, weakness, fatigue   Patient / Family Goals   Patient / Family Goal #1 To get better   Short Term Goals   Short Term Goal # 1 Pt will complete LB dressing with Kiera using AE by discharge.   Goal Outcome # 1 Goal not met   Short Term Goal # 2 Pt will don TLSO with Kiera by discharge.   Goal Outcome # 2 Goal met, new goal added   Short Term Goal # 2 B  Pt will don LSO when OOB > 5 min without verbal cues to initiate by discharge.   Goal Outcome # 2 B Progressing slower than expected   Short Term Goal # 3 Pt will complete standing grooming/hygiene with spv with no verbal cues to maintain spinal precautions by discharge.   Goal Outcome # 3 Progressing as expected

## 2021-02-25 NOTE — PROGRESS NOTES
Neurosurgery Progress Note    Subjective:  Doing well  C/o pain in low back and incisional pain  Ambulatory  Voiding  Denies new pain, numbness, weakness.   Denies HA, positional HA, N/V, dizziness, chest pain, SOB, difficulty breathing, chills      Exam:  VSS  A&Ox4, NAD  NM: 4/4 hip flexion, knee extension, pain limited but c/w prior to surgery  Fasciculations in L quad with knee ext same as prior to surgery  5/5  knee flexion, plantar flexion, dorsiflexion, EHL  Sensation intact and equal throughout all four extremities.   Abdomen: soft, TTP around incision, non-rigid   Non-labored breathing on room air, normal respiratory effort  Capillary refill in all four extremities <2 seconds  No LE edema, erythema, cyanosis, clubbing  Calves non-tender to compression bilat  Incision CDI    BP  Min: 134/72  Max: 153/89  Pulse  Av.8  Min: 96  Max: 113  Resp  Av.3  Min: 16  Max: 17  Temp  Av.9 °C (98.4 °F)  Min: 36.4 °C (97.5 °F)  Max: 37.2 °C (99 °F)  SpO2  Av %  Min: 91 %  Max: 96 %    No data recorded    Recent Labs     21  0932 21  1107   WBC 15.8* 14.6*   RBC 4.06* 3.99*   HEMOGLOBIN 11.2* 11.1*   HEMATOCRIT 35.8* 34.2*   MCV 88.2 85.7   MCH 27.6 27.8   MCHC 31.3* 32.5*   RDW 46.6 45.3   PLATELETCT 392 362   MPV 9.0 9.2     Recent Labs     21  0932 21  1107   SODIUM 132* 136   POTASSIUM 4.9 3.8   CHLORIDE 100 100   CO2 25 22   GLUCOSE 136* 129*   BUN    CREATININE 1.55* 1.33   CALCIUM 9.6 9.9               Intake/Output       21 - 2159 21 - 2159      1900-0659 Total 1900-0659 Total       Intake    P.O.  600  240 840  --  -- --    P.O. 600 240 840 -- -- --    Total Intake 600 240 840 -- -- --       Output    Urine  --  100 100  --  -- --    Number of Times Voided 2 x 3 x 5 x -- -- --    Urine Void (mL) -- 100 100 -- -- --    Stool  --  -- --  --  -- --    Number of Times Stooled -- 0 x 0 x -- -- --    Total Output  -- 100 100 -- -- --       Net I/O     600 140 740 -- -- --            Intake/Output Summary (Last 24 hours) at 2/25/2021 0919  Last data filed at 2/25/2021 0614  Gross per 24 hour   Intake 540 ml   Output 100 ml   Net 440 ml            • enoxaparin (LOVENOX) injection  30 mg Q12HRS   • oxyCODONE immediate-release  10 mg Q4HRS PRN   • oxyCODONE CR  20 mg BID   • Pharmacy Consult Request  1 Each PHARMACY TO DOSE   • MD ALERT...DO NOT ADMINISTER NSAIDS or ASPIRIN unless ORDERED By Neurosurgery  1 Each PRN   • docusate sodium  100 mg BID   • senna-docusate  1 tablet Nightly   • senna-docusate  1 tablet Q24HRS PRN   • polyethylene glycol/lytes  1 Packet BID PRN   • magnesium hydroxide  30 mL QDAY PRN   • bisacodyl  10 mg Q24HRS PRN   • fleet  1 Each Once PRN   • NS   Continuous   • ondansetron  4 mg Q4HRS PRN   • ondansetron  4 mg Q4HRS PRN   • diphenhydrAMINE  25 mg Q6HRS PRN    Or   • diphenhydrAMINE  25 mg Q6HRS PRN   • cyclobenzaprine  10 mg Q8HRS PRN   • labetalol  10 mg Q HOUR PRN   • vitamin D  5,000 Units DAILY       Assessment and Plan:  POD #2 L2-L5 OLIF with plates  Doing well  Discussed adjusting pain medications for more adequate pain control by having patient request oxycodone IR before pain becomes too severe.  D/C flexeril   Add  Ambulate, work with therapies  Therapies recommend home health and front wheel walker  Face-to-face complete.    Continue incentive spirometry Q1H  Continue pain control  Continue stool softeners    Will need 2 week post op with AP and lateral standing XR     Prophylactic anticoagulation: yes         Start date/time: lovenox     Patient agrees with treatment plan.   Case discussed with Dr. Lee.

## 2021-02-25 NOTE — CARE PLAN
Problem: Venous Thromboembolism (VTW)/Deep Vein Thrombosis (DVT) Prevention:  Goal: Patient will participate in Venous Thrombosis (VTE)/Deep Vein Thrombosis (DVT)Prevention Measures  Outcome: PROGRESSING AS EXPECTED  Lovenox in use     Problem: Pain Management  Goal: Pain level will decrease to patient's comfort goal  Outcome: PROGRESSING AS EXPECTED  PRN pain meds in use     Problem: Mobility  Goal: Risk for activity intolerance will decrease  Outcome: PROGRESSING AS EXPECTED   Use of a FWW and LSO brace to ambulate

## 2021-02-26 VITALS
BODY MASS INDEX: 30.7 KG/M2 | WEIGHT: 239.2 LBS | OXYGEN SATURATION: 90 % | HEIGHT: 74 IN | RESPIRATION RATE: 16 BRPM | HEART RATE: 106 BPM | DIASTOLIC BLOOD PRESSURE: 75 MMHG | TEMPERATURE: 97.6 F | SYSTOLIC BLOOD PRESSURE: 128 MMHG

## 2021-02-26 PROCEDURE — A9270 NON-COVERED ITEM OR SERVICE: HCPCS | Performed by: PHYSICIAN ASSISTANT

## 2021-02-26 PROCEDURE — 700102 HCHG RX REV CODE 250 W/ 637 OVERRIDE(OP): Performed by: PHYSICIAN ASSISTANT

## 2021-02-26 PROCEDURE — 700111 HCHG RX REV CODE 636 W/ 250 OVERRIDE (IP): Performed by: STUDENT IN AN ORGANIZED HEALTH CARE EDUCATION/TRAINING PROGRAM

## 2021-02-26 RX ADMIN — OXYCODONE HYDROCHLORIDE 10 MG: 10 TABLET ORAL at 11:13

## 2021-02-26 RX ADMIN — METHOCARBAMOL 750 MG: 750 TABLET ORAL at 04:36

## 2021-02-26 RX ADMIN — Medication 5000 UNITS: at 04:36

## 2021-02-26 RX ADMIN — ENOXAPARIN SODIUM 30 MG: 30 INJECTION SUBCUTANEOUS at 08:51

## 2021-02-26 RX ADMIN — METHOCARBAMOL 750 MG: 750 TABLET ORAL at 11:14

## 2021-02-26 RX ADMIN — DOCUSATE SODIUM 100 MG: 100 CAPSULE, LIQUID FILLED ORAL at 04:36

## 2021-02-26 RX ADMIN — OXYCODONE HYDROCHLORIDE 20 MG: 20 TABLET, FILM COATED, EXTENDED RELEASE ORAL at 04:36

## 2021-02-26 ASSESSMENT — PAIN DESCRIPTION - PAIN TYPE: TYPE: ACUTE PAIN;SURGICAL PAIN

## 2021-02-26 NOTE — CARE PLAN
Problem: Bowel/Gastric:  Goal: Normal bowel function is maintained or improved  Outcome: PROGRESSING AS EXPECTED  Patient states he is passing flatus. Patient has poor PO appetite and refuses to advance diet from liquids.    Problem: Discharge Barriers/Planning  Goal: Patient's continuum of care needs will be met  Outcome: PROGRESSING AS EXPECTED  Patient is up for discharge home today.

## 2021-02-26 NOTE — PROGRESS NOTES
Neurosurgery Progress Note    Subjective:  Doing well  C/o pain in low back and incisional pain improved from yesterday  Ambulatory  Voiding  Denies new pain, numbness, weakness.   Denies HA, positional HA, N/V, dizziness, chest pain, SOB, difficulty breathing, chills      Exam:  VSS  A&Ox4, NAD  NM: 4/4 hip flexion, knee extension, pain limited but c/w prior to surgery  Fasciculations in L quad with knee ext same as prior to surgery  5/5  knee flexion, plantar flexion, dorsiflexion, EHL  Sensation intact and equal throughout all four extremities.   Abdomen: soft, TTP around incision, non-rigid   Non-labored breathing on room air, normal respiratory effort  Capillary refill in all four extremities <2 seconds  No LE edema, erythema, cyanosis, clubbing  Calves non-tender to compression bilat  Incision CDI    BP  Min: 128/75  Max: 154/85  Pulse  Av.5  Min: 100  Max: 110  Resp  Av.5  Min: 16  Max: 17  Temp  Av.8 °C (98.2 °F)  Min: 36.4 °C (97.5 °F)  Max: 37.2 °C (98.9 °F)  SpO2  Av.8 %  Min: 88 %  Max: 91 %    No data recorded    Recent Labs     21  0932 21  1107   WBC 15.8* 14.6*   RBC 4.06* 3.99*   HEMOGLOBIN 11.2* 11.1*   HEMATOCRIT 35.8* 34.2*   MCV 88.2 85.7   MCH 27.6 27.8   MCHC 31.3* 32.5*   RDW 46.6 45.3   PLATELETCT 392 362   MPV 9.0 9.2     Recent Labs     21  0932 21  1107   SODIUM 132* 136   POTASSIUM 4.9 3.8   CHLORIDE 100 100   CO2 25 22   GLUCOSE 136* 129*   BUN    CREATININE 1.55* 1.33   CALCIUM 9.6 9.9               Intake/Output       21 - 21 0659 21 07 - 21 0659      1900-0659 Total  Total       Intake    P.O.  220  -- 220  --  -- --    P.O. 220 -- 220 -- -- --    Total Intake 220 -- 220 -- -- --       Output    Urine  250  -- 250  --  -- --    Urine Void (mL) 250 -- 250 -- -- --    Stool  --  -- --  --  -- --    Number of Times Stooled 1 x -- 1 x -- -- --    Total Output 250 -- 250 -- -- --        Net I/O     -30 -- -30 -- -- --            Intake/Output Summary (Last 24 hours) at 2/26/2021 0905  Last data filed at 2/25/2021 1300  Gross per 24 hour   Intake 220 ml   Output 250 ml   Net -30 ml            • methocarbamol  750 mg TID   • enoxaparin (LOVENOX) injection  30 mg Q12HRS   • oxyCODONE immediate-release  10 mg Q4HRS PRN   • oxyCODONE CR  20 mg BID   • Pharmacy Consult Request  1 Each PHARMACY TO DOSE   • MD ALERT...DO NOT ADMINISTER NSAIDS or ASPIRIN unless ORDERED By Neurosurgery  1 Each PRN   • docusate sodium  100 mg BID   • senna-docusate  1 tablet Nightly   • senna-docusate  1 tablet Q24HRS PRN   • polyethylene glycol/lytes  1 Packet BID PRN   • magnesium hydroxide  30 mL QDAY PRN   • bisacodyl  10 mg Q24HRS PRN   • fleet  1 Each Once PRN   • NS   Continuous   • ondansetron  4 mg Q4HRS PRN   • ondansetron  4 mg Q4HRS PRN   • diphenhydrAMINE  25 mg Q6HRS PRN    Or   • diphenhydrAMINE  25 mg Q6HRS PRN   • labetalol  10 mg Q HOUR PRN   • vitamin D  5,000 Units DAILY       Assessment and Plan:  POD #4 L2-L5 OLIF with plates  Doing well  Pain more well controlled today.  Pt's primary care provider Dr. Dung Bar will prescribe pain medication  Pt has enough pain medication for the weekend.  Pt eager to go home.   Pt states that he may not need HH as he has support at home.    Continue incentive spirometry Q1H  Continue pain control  Continue stool softeners    Will need 2 week post op with AP and lateral standing XR     Prophylactic anticoagulation: yes         Start date/time: lovenox     Pt to discharge today.    Patient agrees with treatment plan.   Case discussed with Dr. Lee.

## 2021-02-26 NOTE — PROGRESS NOTES
"- Patient discharged to home via car with spouse. Patient transported via wheelchair with [facility] CNA escort to [facility] ground level exit.   - AVS reviewed, signed and dated by patient.   - No Controlled Substance required.  - No new prescriptions   - Patient's PIV removed.   - Patient's surgical dressing changed.  - DME include: LSO, FWW  - Patient understands need for follow-up care and understands instructions to make appointment with surgical team.  - Patient is A+O x 4 and verbalized understanding of instructions. Vital signs stable and patient is content with care and plan for discharge. All belongings collected by patient and discharged with patient.    /75   Pulse (!) 106   Temp 36.4 °C (97.6 °F) (Temporal)   Resp 16   Ht 1.88 m (6' 2\")   Wt 109 kg (239 lb 3.2 oz)   SpO2 90%     "

## 2021-02-26 NOTE — DISCHARGE INSTRUCTIONS
Discharge Instructions    Discharged to home by car with relative. Discharged via wheelchair, hospital escort: Yes.  Special equipment needed: Front-Wheel Walker, Lumbar brace    02/22/2021:     PostOp Diagnosis: L4,5 anterolisthesis, L2,3 severe stenosis/retrolisthesis, loss of disc height, L3,4 moderate stenosis, intractable pain that has failed conservative treatment     Procedure(s):  FUSION, SPINE, LUMBAR, INTERBODY, OBLIQUE APPROACH - L2-5 - Wound Class: Clean  FUSION, SPINE, LUMBAR, PLIF - L2-5 W/INST - Wound Class: Clean  LAMINECTOMY, SPINE, LUMBAR, WITH DISCECTOMY. - Wound Class: Clean     Surgeon(s):  OLAYINKA Crum M.D.     Anesthesiologist/Type of Anesthesia:  Anesthesiologist: Yonas Dempsey M.D./General    Complications: none      Discharge Plan: Be sure to schedule a follow-up appointment with your primary care doctor or any specialists as instructed.     - Continue incentive spirometry every hour  - Continue pain control   - Continue stool softeners    - You will need 2 week post op with AP and lateral standing XR     Diet Plan: I understand that a diet low in cholesterol, fat, and sodium is recommended for good health. Unless I have been given specific instructions below for another diet, I accept this instruction as my diet prescription.   Activity Level: Discussed  Confirmed Follow up Appointment: Patient to Call and Schedule Appointment  Confirmed Symptoms Management: Discussed  Medication Reconciliation Updated: Yes  Influenza Vaccine Indication: Not indicated: Previously immunized this influenza season and > 8 years of age      Special Instructions: None    · Is patient discharged on Warfarin / Coumadin?   No     Depression / Suicide Risk    As you are discharged from this [facility] facility, it is important to learn how to keep safe from harming yourself.    Recognize the warning signs:  · Abrupt changes in personality, positive or negative- including increase  in energy   · Giving away possessions  · Change in eating patterns- significant weight changes-  positive or negative  · Change in sleeping patterns- unable to sleep or sleeping all the time   · Unwillingness or inability to communicate  · Depression  · Unusual sadness, discouragement and loneliness  · Talk of wanting to die  · Neglect of personal appearance   · Rebelliousness- reckless behavior  · Withdrawal from people/activities they love  · Confusion- inability to concentrate     If you or a loved one observes any of these behaviors or has concerns about self-harm, here's what you can do:  · Talk about it- your feelings and reasons for harming yourself  · Remove any means that you might use to hurt yourself (examples: pills, rope, extension cords, firearm)  · Get professional help from the community (Mental Health, Substance Abuse, psychological counseling)  · Do not be alone:Call your Safe Contact- someone whom you trust who will be there for you.  · Call your local CRISIS HOTLINE 008-7686 or 555-495-8146  · Call your local Children's Mobile Crisis Response Team [place] (600) 380-4038 or www.[employer]  · Call the toll free National Suicide Prevention Hotlines   · National Suicide Prevention Lifeline 738-501-GZUB (8502)  · National Hope Line Network 800-SUICIDE (597-5969)

## 2021-02-27 NOTE — DISCHARGE SUMMARY
DATE OF ADMISSION:  02/22/2021   DATE OF DISCHARGE:  02/26/2021     ADMISSION DIAGNOSES:  L4-5 anterolisthesis and stenosis; L2-3 severe stenosis   and retrolisthesis with severe disk height and L3-4 moderate stenosis and   intractable pain that failed conservative treatment.     DISCHARGE DIAGNOSES:  L4-5 anterolisthesis and stenosis; L2-3 severe stenosis   and retrolisthesis with severe disk height and L3-4 moderate stenosis and   intractable pain that failed conservative treatment.     PRINCIPAL PROCEDURES PERFORMED:  1.  A left-sided approach for an L4-5 OLIF with plate and placement of a 4WEB   titanium truss interbody and placement of Elite surgical 2-hole plate.  2.  L3-4 anterior to psoas lumbar interbody fusion with placement of a 4WEB   cage and plate.  3.  L2-3 anterior to psoas interbody fusion.     COMPLICATIONS:  None.     HISTORY AND HOSPITAL COURSE:  The patient was recovered in the PACU and   brought to the floor.  The patient recovered on the floor without incident.    The patient reported having increased low back pain and anterior thigh pain,   which was controlled over the period of days.  The patient was worked with   therapies who felt that he may benefit from home health care.  On the day of   discharge, the patient discussed that he feels that he was able to take care   of himself and that he had adequate support network to help maintain his   needs.  The patient reports that his primary care provider will be providing   his postoperative pain medications.  We recommend that the patient remain on   OxyContin 20 mg b.i.d. with oxycodone 10 mg every 4 hours p.r.n. pain.  The   patient will also be discharged home with Robaxin.     DISCHARGE AND DISPOSITION:  The patient will be discharged home in stable   condition.  The patient will be discharged home with a walker.  The patient   will be discharged home without home health as the patient declined.  The   patient's pain management will be  resuming care.  The patient reports that he   has enough pain medication for the weekend.  The patient will follow up with   advanced neurosurgery in 2 weeks.  The patient was encouraged to call or   return to clinic sooner should he experience new or worsening symptoms.  The   patient indicated that he understood and agreed to the treatment plan as   discussed.        ______________________________  Michael Kc PA-C        ______________________________  MICHAEL DHALIWAL MD    [facility]/SUB/DUR    DD:  02/27/2021 10:09  DT:  02/27/2021 11:00    Job#:  [id number]

## 2021-08-14 ENCOUNTER — HOSPITAL ENCOUNTER (INPATIENT)
Dept: HOSPITAL 8 - ED | Age: 71
LOS: 7 days | Discharge: HOME | DRG: 853 | End: 2021-08-21
Attending: HOSPITALIST | Admitting: INTERNAL MEDICINE
Payer: MEDICARE

## 2021-08-14 VITALS — WEIGHT: 307.77 LBS | HEIGHT: 74 IN | BODY MASS INDEX: 39.5 KG/M2

## 2021-08-14 VITALS — SYSTOLIC BLOOD PRESSURE: 128 MMHG | DIASTOLIC BLOOD PRESSURE: 70 MMHG

## 2021-08-14 DIAGNOSIS — K65.0: ICD-10-CM

## 2021-08-14 DIAGNOSIS — E78.00: ICD-10-CM

## 2021-08-14 DIAGNOSIS — Z79.899: ICD-10-CM

## 2021-08-14 DIAGNOSIS — Z87.442: ICD-10-CM
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
## 2021-08-14 DIAGNOSIS — E11.9: ICD-10-CM

## 2021-08-14 DIAGNOSIS — K57.80: ICD-10-CM

## 2021-08-14 DIAGNOSIS — K56.7: ICD-10-CM

## 2021-08-14 DIAGNOSIS — L76.34: ICD-10-CM

## 2021-08-14 DIAGNOSIS — E78.5: ICD-10-CM

## 2021-08-14 DIAGNOSIS — A41.9: Primary | ICD-10-CM

## 2021-08-14 DIAGNOSIS — B96.20: ICD-10-CM

## 2021-08-14 DIAGNOSIS — Z82.49: ICD-10-CM

## 2021-08-14 DIAGNOSIS — J44.9: ICD-10-CM

## 2021-08-14 DIAGNOSIS — J96.11: ICD-10-CM
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
## 2021-08-14 DIAGNOSIS — I10: ICD-10-CM

## 2021-08-14 DIAGNOSIS — N17.9: ICD-10-CM

## 2021-08-14 DIAGNOSIS — Z88.8: ICD-10-CM

## 2021-08-14 DIAGNOSIS — Z79.84: ICD-10-CM

## 2021-08-14 DIAGNOSIS — N40.0: ICD-10-CM

## 2021-08-14 PROCEDURE — 87205 SMEAR GRAM STAIN: CPT

## 2021-08-14 PROCEDURE — 87186 SC STD MICRODIL/AGAR DIL: CPT

## 2021-08-14 PROCEDURE — 87075 CULTR BACTERIA EXCEPT BLOOD: CPT

## 2021-08-14 PROCEDURE — 83735 ASSAY OF MAGNESIUM: CPT

## 2021-08-14 PROCEDURE — 36415 COLL VENOUS BLD VENIPUNCTURE: CPT

## 2021-08-14 PROCEDURE — 87040 BLOOD CULTURE FOR BACTERIA: CPT

## 2021-08-14 PROCEDURE — 74177 CT ABD & PELVIS W/CONTRAST: CPT

## 2021-08-14 PROCEDURE — 0DB80ZZ EXCISION OF SMALL INTESTINE, OPEN APPROACH: ICD-10-PCS | Performed by: SURGERY

## 2021-08-14 PROCEDURE — 80053 COMPREHEN METABOLIC PANEL: CPT

## 2021-08-14 PROCEDURE — 99285 EMERGENCY DEPT VISIT HI MDM: CPT

## 2021-08-14 PROCEDURE — 87070 CULTURE OTHR SPECIMN AEROBIC: CPT

## 2021-08-14 PROCEDURE — 87077 CULTURE AEROBIC IDENTIFY: CPT

## 2021-08-14 PROCEDURE — 80048 BASIC METABOLIC PNL TOTAL CA: CPT

## 2021-08-14 PROCEDURE — 88307 TISSUE EXAM BY PATHOLOGIST: CPT

## 2021-08-14 PROCEDURE — 85025 COMPLETE CBC W/AUTO DIFF WBC: CPT

## 2021-08-14 PROCEDURE — C1765 ADHESION BARRIER: HCPCS

## 2021-08-14 PROCEDURE — 84100 ASSAY OF PHOSPHORUS: CPT

## 2021-08-14 PROCEDURE — 94640 AIRWAY INHALATION TREATMENT: CPT

## 2021-08-14 PROCEDURE — 96375 TX/PRO/DX INJ NEW DRUG ADDON: CPT

## 2021-08-14 PROCEDURE — 96374 THER/PROPH/DIAG INJ IV PUSH: CPT

## 2021-08-14 RX ADMIN — PIPERACILLIN SODIUM AND TAZOBACTAM SODIUM SCH MLS/HR: 3; .375 INJECTION, POWDER, LYOPHILIZED, FOR SOLUTION INTRAVENOUS at 19:58

## 2021-08-14 RX ADMIN — FAMOTIDINE SCH MG: 10 INJECTION INTRAVENOUS at 23:05

## 2021-08-14 RX ADMIN — FAMOTIDINE SCH MG: 10 INJECTION INTRAVENOUS at 23:15

## 2021-08-14 NOTE — NUR
PT SUPINE ON [facility] VSS. PT DENIES ANY NEEDS AT THIS TIME. FAMILY 
REMAINS AT BEDSIDE. CALL LIGHT AND BELONGINGS WITHIN REACH.

## 2021-08-15 VITALS — DIASTOLIC BLOOD PRESSURE: 66 MMHG | SYSTOLIC BLOOD PRESSURE: 113 MMHG

## 2021-08-15 VITALS — DIASTOLIC BLOOD PRESSURE: 75 MMHG | SYSTOLIC BLOOD PRESSURE: 117 MMHG

## 2021-08-15 VITALS — DIASTOLIC BLOOD PRESSURE: 77 MMHG | SYSTOLIC BLOOD PRESSURE: 101 MMHG

## 2021-08-15 VITALS — SYSTOLIC BLOOD PRESSURE: 134 MMHG | DIASTOLIC BLOOD PRESSURE: 66 MMHG

## 2021-08-15 LAB
<PLATELET ESTIMATE>: ADEQUATE
<PLT MORPHOLOGY>: (no result)
ANION GAP SERPL CALC-SCNC: 11 MMOL/L (ref 5–15)
ANISOCYTOSIS BLD QL SMEAR: (no result)
BAND#(MANUAL): 1.25 X10^3/UL
CALCIUM SERPL-MCNC: 8.9 MG/DL (ref 8.5–10.1)
CHLORIDE SERPL-SCNC: 103 MMOL/L (ref 98–107)
CREAT SERPL-MCNC: 1.89 MG/DL (ref 0.7–1.3)
ERYTHROCYTE [DISTWIDTH] IN BLOOD BY AUTOMATED COUNT: 15.8 % (ref 9.4–14.8)
LYMPH#(MANUAL): 0.56 X10^3/UL (ref 1–3.4)
LYMPHS% (MANUAL): 4 % (ref 22–44)
MCH RBC QN AUTO: 27.5 PG (ref 27.5–34.5)
MCHC RBC AUTO-ENTMCNC: 32.4 G/DL (ref 33.2–36.2)
MONOS#(MANUAL): 1.11 X10^3/UL (ref 0.3–2.7)
MONOS% (MANUAL): 8 % (ref 2–9)
NEUTS BAND NFR BLD: 9 % (ref 0–7)
PLATELET # BLD AUTO: 252 X10^3/UL (ref 130–400)
PMNS WITH VACUOLES: (no result)
PMV BLD AUTO: 7.8 FL (ref 7.4–10.4)
RBC # BLD AUTO: 4.38 X10^6/UL (ref 4.38–5.82)
SEG#(MANUAL): 10.98 X10^3/UL (ref 1.8–6.8)
SEGS% (MANUAL): 79 % (ref 42–75)

## 2021-08-15 RX ADMIN — ACETAMINOPHEN SCH MG: 500 TABLET, COATED ORAL at 15:39

## 2021-08-15 RX ADMIN — HYDROMORPHONE HYDROCHLORIDE PRN MG: 2 INJECTION INTRAMUSCULAR; INTRAVENOUS; SUBCUTANEOUS at 04:38

## 2021-08-15 RX ADMIN — ACETAMINOPHEN SCH MG: 500 TABLET, COATED ORAL at 11:13

## 2021-08-15 RX ADMIN — PIPERACILLIN SODIUM AND TAZOBACTAM SODIUM SCH MLS/HR: 3; .375 INJECTION, POWDER, LYOPHILIZED, FOR SOLUTION INTRAVENOUS at 11:13

## 2021-08-15 RX ADMIN — PIPERACILLIN SODIUM AND TAZOBACTAM SODIUM SCH MLS/HR: 3; .375 INJECTION, POWDER, LYOPHILIZED, FOR SOLUTION INTRAVENOUS at 04:02

## 2021-08-15 RX ADMIN — FENTANYL CITRATE PRN MCG: 50 INJECTION INTRAMUSCULAR; INTRAVENOUS at 02:02

## 2021-08-15 RX ADMIN — ACETAMINOPHEN SCH MG: 500 TABLET, COATED ORAL at 06:33

## 2021-08-15 RX ADMIN — HYDROMORPHONE HYDROCHLORIDE PRN MG: 1 INJECTION, SOLUTION INTRAMUSCULAR; INTRAVENOUS; SUBCUTANEOUS at 02:40

## 2021-08-15 RX ADMIN — PIPERACILLIN SODIUM AND TAZOBACTAM SODIUM SCH MLS/HR: 3; .375 INJECTION, POWDER, LYOPHILIZED, FOR SOLUTION INTRAVENOUS at 19:24

## 2021-08-15 RX ADMIN — GABAPENTIN SCH MG: 300 CAPSULE ORAL at 08:49

## 2021-08-15 RX ADMIN — GABAPENTIN SCH MG: 300 CAPSULE ORAL at 15:39

## 2021-08-15 RX ADMIN — SODIUM CHLORIDE, SODIUM LACTATE, POTASSIUM CHLORIDE, AND CALCIUM CHLORIDE SCH MLS/HR: .6; .31; .03; .02 INJECTION, SOLUTION INTRAVENOUS at 15:39

## 2021-08-15 RX ADMIN — SODIUM CHLORIDE, SODIUM LACTATE, POTASSIUM CHLORIDE, AND CALCIUM CHLORIDE SCH MLS/HR: .6; .31; .03; .02 INJECTION, SOLUTION INTRAVENOUS at 03:48

## 2021-08-15 RX ADMIN — HYDROMORPHONE HYDROCHLORIDE PRN MG: 1 INJECTION, SOLUTION INTRAMUSCULAR; INTRAVENOUS; SUBCUTANEOUS at 02:28

## 2021-08-15 RX ADMIN — GABAPENTIN SCH MG: 300 CAPSULE ORAL at 21:00

## 2021-08-15 RX ADMIN — FENTANYL CITRATE PRN MCG: 50 INJECTION INTRAMUSCULAR; INTRAVENOUS at 02:08

## 2021-08-15 RX ADMIN — HYDROMORPHONE HYDROCHLORIDE PRN MG: 1 INJECTION, SOLUTION INTRAMUSCULAR; INTRAVENOUS; SUBCUTANEOUS at 02:22

## 2021-08-15 RX ADMIN — FENTANYL CITRATE PRN MCG: 50 INJECTION INTRAMUSCULAR; INTRAVENOUS at 01:40

## 2021-08-15 RX ADMIN — ACETAMINOPHEN SCH MG: 500 TABLET, COATED ORAL at 22:17

## 2021-08-15 RX ADMIN — HYDROMORPHONE HYDROCHLORIDE PRN MG: 2 INJECTION INTRAMUSCULAR; INTRAVENOUS; SUBCUTANEOUS at 09:04

## 2021-08-15 RX ADMIN — FAMOTIDINE SCH MG: 10 INJECTION INTRAVENOUS at 09:04

## 2021-08-15 RX ADMIN — ENOXAPARIN SODIUM SCH MG: 40 INJECTION SUBCUTANEOUS at 11:12

## 2021-08-16 VITALS — SYSTOLIC BLOOD PRESSURE: 113 MMHG | DIASTOLIC BLOOD PRESSURE: 65 MMHG

## 2021-08-16 VITALS — DIASTOLIC BLOOD PRESSURE: 82 MMHG | SYSTOLIC BLOOD PRESSURE: 168 MMHG

## 2021-08-16 VITALS — SYSTOLIC BLOOD PRESSURE: 122 MMHG | DIASTOLIC BLOOD PRESSURE: 71 MMHG

## 2021-08-16 VITALS — DIASTOLIC BLOOD PRESSURE: 71 MMHG | SYSTOLIC BLOOD PRESSURE: 126 MMHG

## 2021-08-16 VITALS — SYSTOLIC BLOOD PRESSURE: 133 MMHG | DIASTOLIC BLOOD PRESSURE: 78 MMHG

## 2021-08-16 LAB
ANION GAP SERPL CALC-SCNC: 6 MMOL/L (ref 5–15)
BASOPHILS # BLD AUTO: 0 X10^3/UL (ref 0–0.1)
BASOPHILS NFR BLD AUTO: 0 % (ref 0–1)
CALCIUM SERPL-MCNC: 9.3 MG/DL (ref 8.5–10.1)
CHLORIDE SERPL-SCNC: 107 MMOL/L (ref 98–107)
CREAT SERPL-MCNC: 1.74 MG/DL (ref 0.7–1.3)
EOSINOPHIL # BLD AUTO: 0 X10^3/UL (ref 0–0.4)
EOSINOPHIL NFR BLD AUTO: 0 % (ref 1–7)
ERYTHROCYTE [DISTWIDTH] IN BLOOD BY AUTOMATED COUNT: 15.9 % (ref 9.4–14.8)
LYMPHOCYTES # BLD AUTO: 1 X10^3/UL (ref 1–3.4)
LYMPHOCYTES NFR BLD AUTO: 7 % (ref 22–44)
MCH RBC QN AUTO: 27.4 PG (ref 27.5–34.5)
MCHC RBC AUTO-ENTMCNC: 32.4 G/DL (ref 33.2–36.2)
MONOCYTES # BLD AUTO: 1.6 X10^3/UL (ref 0.2–0.8)
MONOCYTES NFR BLD AUTO: 11 % (ref 2–9)
NEUTROPHILS # BLD AUTO: 11.7 X10^3/UL (ref 1.8–6.8)
NEUTROPHILS NFR BLD AUTO: 82 % (ref 42–75)
PLATELET # BLD AUTO: 309 X10^3/UL (ref 130–400)
PMV BLD AUTO: 7.6 FL (ref 7.4–10.4)
RBC # BLD AUTO: 4.05 X10^6/UL (ref 4.38–5.82)

## 2021-08-16 RX ADMIN — ACETAMINOPHEN SCH MG: 500 TABLET, COATED ORAL at 22:14

## 2021-08-16 RX ADMIN — ACETAMINOPHEN SCH MG: 500 TABLET, COATED ORAL at 06:08

## 2021-08-16 RX ADMIN — SODIUM CHLORIDE, SODIUM LACTATE, POTASSIUM CHLORIDE, AND CALCIUM CHLORIDE SCH MLS/HR: .6; .31; .03; .02 INJECTION, SOLUTION INTRAVENOUS at 08:08

## 2021-08-16 RX ADMIN — TAMSULOSIN HYDROCHLORIDE SCH MG: 0.4 CAPSULE ORAL at 08:06

## 2021-08-16 RX ADMIN — ALLOPURINOL SCH MG: 100 TABLET ORAL at 08:06

## 2021-08-16 RX ADMIN — MONTELUKAST SODIUM SCH MG: 10 TABLET ORAL at 20:22

## 2021-08-16 RX ADMIN — PIPERACILLIN SODIUM AND TAZOBACTAM SODIUM SCH MLS/HR: 3; .375 INJECTION, POWDER, LYOPHILIZED, FOR SOLUTION INTRAVENOUS at 04:06

## 2021-08-16 RX ADMIN — ACETAMINOPHEN SCH MG: 500 TABLET, COATED ORAL at 11:45

## 2021-08-16 RX ADMIN — GABAPENTIN SCH MG: 300 CAPSULE ORAL at 08:20

## 2021-08-16 RX ADMIN — GABAPENTIN SCH MG: 300 CAPSULE ORAL at 08:06

## 2021-08-16 RX ADMIN — FINASTERIDE SCH MG: 5 TABLET, FILM COATED ORAL at 08:07

## 2021-08-16 RX ADMIN — SODIUM CHLORIDE, SODIUM LACTATE, POTASSIUM CHLORIDE, AND CALCIUM CHLORIDE SCH MLS/HR: .6; .31; .03; .02 INJECTION, SOLUTION INTRAVENOUS at 17:52

## 2021-08-16 RX ADMIN — PIPERACILLIN SODIUM AND TAZOBACTAM SODIUM SCH MLS/HR: 3; .375 INJECTION, POWDER, LYOPHILIZED, FOR SOLUTION INTRAVENOUS at 11:45

## 2021-08-16 RX ADMIN — CETIRIZINE HYDROCHLORIDE SCH MG: 10 TABLET, FILM COATED ORAL at 08:06

## 2021-08-16 RX ADMIN — ACETAMINOPHEN SCH MG: 500 TABLET, COATED ORAL at 16:42

## 2021-08-16 RX ADMIN — PIPERACILLIN SODIUM AND TAZOBACTAM SODIUM SCH MLS/HR: 3; .375 INJECTION, POWDER, LYOPHILIZED, FOR SOLUTION INTRAVENOUS at 20:22

## 2021-08-16 RX ADMIN — ENOXAPARIN SODIUM SCH MG: 40 INJECTION SUBCUTANEOUS at 11:45

## 2021-08-17 VITALS — SYSTOLIC BLOOD PRESSURE: 131 MMHG | DIASTOLIC BLOOD PRESSURE: 84 MMHG

## 2021-08-17 VITALS — SYSTOLIC BLOOD PRESSURE: 149 MMHG | DIASTOLIC BLOOD PRESSURE: 78 MMHG

## 2021-08-17 VITALS — SYSTOLIC BLOOD PRESSURE: 147 MMHG | DIASTOLIC BLOOD PRESSURE: 91 MMHG

## 2021-08-17 VITALS — DIASTOLIC BLOOD PRESSURE: 65 MMHG | SYSTOLIC BLOOD PRESSURE: 162 MMHG

## 2021-08-17 LAB
<PLATELET ESTIMATE>: ADEQUATE
<PLT MORPHOLOGY>: (no result)
ANION GAP SERPL CALC-SCNC: 6 MMOL/L (ref 5–15)
ANISOCYTOSIS BLD QL SMEAR: (no result)
BAND#(MANUAL): 0.53 X10^3/UL
CALCIUM SERPL-MCNC: 9.6 MG/DL (ref 8.5–10.1)
CHLORIDE SERPL-SCNC: 108 MMOL/L (ref 98–107)
CREAT SERPL-MCNC: 1.6 MG/DL (ref 0.7–1.3)
EOS#(MANUAL): 0.27 X10^3/UL (ref 0–0.4)
EOS% (MANUAL): 2 % (ref 1–7)
ERYTHROCYTE [DISTWIDTH] IN BLOOD BY AUTOMATED COUNT: 16.1 % (ref 9.4–14.8)
LYMPH#(MANUAL): 1.06 X10^3/UL (ref 1–3.4)
LYMPHS% (MANUAL): 8 % (ref 22–44)
MCH RBC QN AUTO: 27.6 PG (ref 27.5–34.5)
MCHC RBC AUTO-ENTMCNC: 32.6 G/DL (ref 33.2–36.2)
METAMYELOCYTES# (MANUAL): 0.13 X10^3/UL (ref 0–0)
METAMYELOCYTES% (MANUAL): 1 % (ref 0–1)
MONOS#(MANUAL): 1.06 X10^3/UL (ref 0.3–2.7)
MONOS% (MANUAL): 8 % (ref 2–9)
MYELOCYTES# (MANUAL): 0.13 X10^3/UL (ref 0–0)
MYELOCYTES% (MANUAL): 1 % (ref 0–0)
NEUTS BAND NFR BLD: 4 % (ref 0–7)
PLATELET # BLD AUTO: 386 X10^3/UL (ref 130–400)
PMV BLD AUTO: 7.8 FL (ref 7.4–10.4)
RBC # BLD AUTO: 4.15 X10^6/UL (ref 4.38–5.82)
REACTIVE LYMPHS # (MANUAL): 0.27 X10^3/UL (ref 0–0)
REACTIVE LYMPHS % (MANUAL): 2 % (ref 0–0)
SEG#(MANUAL): 9.84 X10^3/UL (ref 1.8–6.8)
SEGS% (MANUAL): 74 % (ref 42–75)
TOXIC GRAN: (no result)

## 2021-08-17 RX ADMIN — SODIUM CHLORIDE, SODIUM LACTATE, POTASSIUM CHLORIDE, AND CALCIUM CHLORIDE SCH MLS/HR: .6; .31; .03; .02 INJECTION, SOLUTION INTRAVENOUS at 14:00

## 2021-08-17 RX ADMIN — PIPERACILLIN SODIUM AND TAZOBACTAM SODIUM SCH MLS/HR: 3; .375 INJECTION, POWDER, LYOPHILIZED, FOR SOLUTION INTRAVENOUS at 11:14

## 2021-08-17 RX ADMIN — ACETAMINOPHEN SCH MG: 500 TABLET, COATED ORAL at 22:36

## 2021-08-17 RX ADMIN — ACETAMINOPHEN SCH MG: 500 TABLET, COATED ORAL at 09:39

## 2021-08-17 RX ADMIN — ACETAMINOPHEN SCH MG: 500 TABLET, COATED ORAL at 03:30

## 2021-08-17 RX ADMIN — PIPERACILLIN SODIUM AND TAZOBACTAM SODIUM SCH MLS/HR: 3; .375 INJECTION, POWDER, LYOPHILIZED, FOR SOLUTION INTRAVENOUS at 03:31

## 2021-08-17 RX ADMIN — METOPROLOL TARTRATE SCH MG: 25 TABLET, FILM COATED ORAL at 05:53

## 2021-08-17 RX ADMIN — METOPROLOL TARTRATE SCH MG: 25 TABLET, FILM COATED ORAL at 17:22

## 2021-08-17 RX ADMIN — PIPERACILLIN SODIUM AND TAZOBACTAM SODIUM SCH MLS/HR: 3; .375 INJECTION, POWDER, LYOPHILIZED, FOR SOLUTION INTRAVENOUS at 20:05

## 2021-08-17 RX ADMIN — FINASTERIDE SCH MG: 5 TABLET, FILM COATED ORAL at 08:16

## 2021-08-17 RX ADMIN — FAMOTIDINE SCH MG: 20 TABLET, FILM COATED ORAL at 08:16

## 2021-08-17 RX ADMIN — ACETAMINOPHEN SCH MG: 500 TABLET, COATED ORAL at 16:10

## 2021-08-17 RX ADMIN — SODIUM CHLORIDE, SODIUM LACTATE, POTASSIUM CHLORIDE, AND CALCIUM CHLORIDE SCH MLS/HR: .6; .31; .03; .02 INJECTION, SOLUTION INTRAVENOUS at 03:30

## 2021-08-17 RX ADMIN — TAMSULOSIN HYDROCHLORIDE SCH MG: 0.4 CAPSULE ORAL at 08:16

## 2021-08-17 RX ADMIN — MONTELUKAST SODIUM SCH MG: 10 TABLET ORAL at 20:05

## 2021-08-17 RX ADMIN — CETIRIZINE HYDROCHLORIDE SCH MG: 10 TABLET, FILM COATED ORAL at 08:15

## 2021-08-17 RX ADMIN — ENOXAPARIN SODIUM SCH MG: 40 INJECTION SUBCUTANEOUS at 11:14

## 2021-08-17 RX ADMIN — ALLOPURINOL SCH MG: 100 TABLET ORAL at 08:15

## 2021-08-17 RX ADMIN — LOSARTAN POTASSIUM SCH MG: 50 TABLET, FILM COATED ORAL at 08:16

## 2021-08-18 VITALS — SYSTOLIC BLOOD PRESSURE: 129 MMHG | DIASTOLIC BLOOD PRESSURE: 71 MMHG

## 2021-08-18 VITALS — SYSTOLIC BLOOD PRESSURE: 148 MMHG | DIASTOLIC BLOOD PRESSURE: 85 MMHG

## 2021-08-18 VITALS — SYSTOLIC BLOOD PRESSURE: 144 MMHG | DIASTOLIC BLOOD PRESSURE: 80 MMHG

## 2021-08-18 VITALS — DIASTOLIC BLOOD PRESSURE: 77 MMHG | SYSTOLIC BLOOD PRESSURE: 141 MMHG

## 2021-08-18 VITALS — SYSTOLIC BLOOD PRESSURE: 150 MMHG | DIASTOLIC BLOOD PRESSURE: 83 MMHG

## 2021-08-18 LAB
<PLATELET ESTIMATE>: (no result)
ANION GAP SERPL CALC-SCNC: 6 MMOL/L (ref 5–15)
ANISOCYTOSIS BLD QL SMEAR: (no result)
BAND#(MANUAL): 0.41 X10^3/UL
CALCIUM SERPL-MCNC: 9.4 MG/DL (ref 8.5–10.1)
CHLORIDE SERPL-SCNC: 109 MMOL/L (ref 98–107)
CREAT SERPL-MCNC: 1.43 MG/DL (ref 0.7–1.3)
EOS#(MANUAL): 0.2 X10^3/UL (ref 0–0.4)
EOS% (MANUAL): 1 % (ref 1–7)
ERYTHROCYTE [DISTWIDTH] IN BLOOD BY AUTOMATED COUNT: 16.1 % (ref 9.4–14.8)
LYMPH#(MANUAL): 1.62 X10^3/UL (ref 1–3.4)
LYMPHS% (MANUAL): 8 % (ref 22–44)
MCH RBC QN AUTO: 27.6 PG (ref 27.5–34.5)
MCHC RBC AUTO-ENTMCNC: 32.9 G/DL (ref 33.2–36.2)
METAMYELOCYTES# (MANUAL): 0.41 X10^3/UL (ref 0–0)
METAMYELOCYTES% (MANUAL): 2 % (ref 0–1)
MONOS#(MANUAL): 1.22 X10^3/UL (ref 0.3–2.7)
MONOS% (MANUAL): 6 % (ref 2–9)
MYELOCYTES# (MANUAL): 0.2 X10^3/UL (ref 0–0)
MYELOCYTES% (MANUAL): 1 % (ref 0–0)
NEUTS BAND NFR BLD: 2 % (ref 0–7)
OTHER CELLS # (MANUAL): 1.22 X10^3/UL (ref 0–0)
OTHER CELLS % (MANUAL): 6 % (ref 0–0)
PLATELET # BLD AUTO: 481 X10^3/UL (ref 130–400)
PMNS WITH VACUOLES: (no result)
PMV BLD AUTO: 7.2 FL (ref 7.4–10.4)
RBC # BLD AUTO: 4.99 X10^6/UL (ref 4.38–5.82)
SEG#(MANUAL): 15.02 X10^3/UL (ref 1.8–6.8)
SEGS% (MANUAL): 74 % (ref 42–75)
TOXIC GRAN: (no result)

## 2021-08-18 RX ADMIN — ENOXAPARIN SODIUM SCH MG: 40 INJECTION SUBCUTANEOUS at 10:31

## 2021-08-18 RX ADMIN — PIPERACILLIN SODIUM AND TAZOBACTAM SODIUM SCH MLS/HR: 3; .375 INJECTION, POWDER, LYOPHILIZED, FOR SOLUTION INTRAVENOUS at 11:10

## 2021-08-18 RX ADMIN — ACETAMINOPHEN SCH MG: 500 TABLET, COATED ORAL at 22:21

## 2021-08-18 RX ADMIN — FAMOTIDINE SCH MG: 20 TABLET, FILM COATED ORAL at 08:30

## 2021-08-18 RX ADMIN — PIPERACILLIN SODIUM AND TAZOBACTAM SODIUM SCH MLS/HR: 3; .375 INJECTION, POWDER, LYOPHILIZED, FOR SOLUTION INTRAVENOUS at 20:22

## 2021-08-18 RX ADMIN — METOPROLOL TARTRATE SCH MG: 25 TABLET, FILM COATED ORAL at 16:28

## 2021-08-18 RX ADMIN — SODIUM CHLORIDE, SODIUM LACTATE, POTASSIUM CHLORIDE, AND CALCIUM CHLORIDE SCH MLS/HR: .6; .31; .03; .02 INJECTION, SOLUTION INTRAVENOUS at 14:29

## 2021-08-18 RX ADMIN — ACETAMINOPHEN SCH MG: 500 TABLET, COATED ORAL at 08:29

## 2021-08-18 RX ADMIN — METOPROLOL TARTRATE SCH MG: 25 TABLET, FILM COATED ORAL at 06:03

## 2021-08-18 RX ADMIN — LOSARTAN POTASSIUM SCH MG: 50 TABLET, FILM COATED ORAL at 08:30

## 2021-08-18 RX ADMIN — MONTELUKAST SODIUM SCH MG: 10 TABLET ORAL at 20:22

## 2021-08-18 RX ADMIN — CETIRIZINE HYDROCHLORIDE SCH MG: 10 TABLET, FILM COATED ORAL at 08:29

## 2021-08-18 RX ADMIN — ALLOPURINOL SCH MG: 100 TABLET ORAL at 08:29

## 2021-08-18 RX ADMIN — TAMSULOSIN HYDROCHLORIDE SCH MG: 0.4 CAPSULE ORAL at 08:29

## 2021-08-18 RX ADMIN — PIPERACILLIN SODIUM AND TAZOBACTAM SODIUM SCH MLS/HR: 3; .375 INJECTION, POWDER, LYOPHILIZED, FOR SOLUTION INTRAVENOUS at 03:36

## 2021-08-18 RX ADMIN — ACETAMINOPHEN SCH MG: 500 TABLET, COATED ORAL at 16:28

## 2021-08-18 RX ADMIN — ACETAMINOPHEN SCH MG: 500 TABLET, COATED ORAL at 03:36

## 2021-08-18 RX ADMIN — FINASTERIDE SCH MG: 5 TABLET, FILM COATED ORAL at 08:29

## 2021-08-18 RX ADMIN — SODIUM CHLORIDE, SODIUM LACTATE, POTASSIUM CHLORIDE, AND CALCIUM CHLORIDE SCH MLS/HR: .6; .31; .03; .02 INJECTION, SOLUTION INTRAVENOUS at 03:36

## 2021-08-19 VITALS — DIASTOLIC BLOOD PRESSURE: 83 MMHG | SYSTOLIC BLOOD PRESSURE: 158 MMHG

## 2021-08-19 VITALS — DIASTOLIC BLOOD PRESSURE: 83 MMHG | SYSTOLIC BLOOD PRESSURE: 150 MMHG

## 2021-08-19 VITALS — SYSTOLIC BLOOD PRESSURE: 164 MMHG | DIASTOLIC BLOOD PRESSURE: 82 MMHG

## 2021-08-19 VITALS — SYSTOLIC BLOOD PRESSURE: 157 MMHG | DIASTOLIC BLOOD PRESSURE: 76 MMHG

## 2021-08-19 LAB
<PLATELET ESTIMATE>: (no result)
<PLT MORPHOLOGY>: (no result)
ALBUMIN SERPL-MCNC: 2 G/DL (ref 3.4–5)
ALP SERPL-CCNC: 90 U/L (ref 45–117)
ALT SERPL-CCNC: 43 U/L (ref 12–78)
ANION GAP SERPL CALC-SCNC: 9 MMOL/L (ref 5–15)
ANISOCYTOSIS BLD QL SMEAR: (no result)
BAND#(MANUAL): 1.01 X10^3/UL
BILIRUB SERPL-MCNC: 0.8 MG/DL (ref 0.2–1)
CALCIUM SERPL-MCNC: 9.1 MG/DL (ref 8.5–10.1)
CHLORIDE SERPL-SCNC: 109 MMOL/L (ref 98–107)
CREAT SERPL-MCNC: 1.33 MG/DL (ref 0.7–1.3)
EOS#(MANUAL): 0.4 X10^3/UL (ref 0–0.4)
EOS% (MANUAL): 2 % (ref 1–7)
ERYTHROCYTE [DISTWIDTH] IN BLOOD BY AUTOMATED COUNT: 16.1 % (ref 9.4–14.8)
LYMPH#(MANUAL): 1.81 X10^3/UL (ref 1–3.4)
LYMPHS% (MANUAL): 9 % (ref 22–44)
MCH RBC QN AUTO: 28 PG (ref 27.5–34.5)
MCHC RBC AUTO-ENTMCNC: 33.4 G/DL (ref 33.2–36.2)
METAMYELOCYTES# (MANUAL): 1.01 X10^3/UL (ref 0–0)
METAMYELOCYTES% (MANUAL): 5 % (ref 0–1)
MONOS#(MANUAL): 1.61 X10^3/UL (ref 0.3–2.7)
MONOS% (MANUAL): 8 % (ref 2–9)
MYELOCYTES# (MANUAL): 0.4 X10^3/UL (ref 0–0)
MYELOCYTES% (MANUAL): 2 % (ref 0–0)
NEUTS BAND NFR BLD: 5 % (ref 0–7)
PLATELET # BLD AUTO: 464 X10^3/UL (ref 130–400)
PMV BLD AUTO: 7.4 FL (ref 7.4–10.4)
PROT SERPL-MCNC: 6.2 G/DL (ref 6.4–8.2)
RBC # BLD AUTO: 4.02 X10^6/UL (ref 4.38–5.82)
REACTIVE LYMPHS # (MANUAL): 0.2 X10^3/UL (ref 0–0)
REACTIVE LYMPHS % (MANUAL): 1 % (ref 0–0)
SEG#(MANUAL): 13.67 X10^3/UL (ref 1.8–6.8)
SEGS% (MANUAL): 68 % (ref 42–75)

## 2021-08-19 RX ADMIN — SODIUM CHLORIDE, SODIUM LACTATE, POTASSIUM CHLORIDE, AND CALCIUM CHLORIDE SCH MLS/HR: .6; .31; .03; .02 INJECTION, SOLUTION INTRAVENOUS at 00:56

## 2021-08-19 RX ADMIN — FINASTERIDE SCH MG: 5 TABLET, FILM COATED ORAL at 09:00

## 2021-08-19 RX ADMIN — MONTELUKAST SODIUM SCH MG: 10 TABLET ORAL at 20:13

## 2021-08-19 RX ADMIN — METOPROLOL TARTRATE SCH MG: 25 TABLET, FILM COATED ORAL at 18:28

## 2021-08-19 RX ADMIN — METOPROLOL TARTRATE SCH MG: 25 TABLET, FILM COATED ORAL at 05:29

## 2021-08-19 RX ADMIN — ACETAMINOPHEN SCH MG: 500 TABLET, COATED ORAL at 11:23

## 2021-08-19 RX ADMIN — ALLOPURINOL SCH MG: 100 TABLET ORAL at 08:58

## 2021-08-19 RX ADMIN — ACETAMINOPHEN SCH MG: 500 TABLET, COATED ORAL at 21:47

## 2021-08-19 RX ADMIN — TAMSULOSIN HYDROCHLORIDE SCH MG: 0.4 CAPSULE ORAL at 08:58

## 2021-08-19 RX ADMIN — PIPERACILLIN SODIUM AND TAZOBACTAM SODIUM SCH MLS/HR: 3; .375 INJECTION, POWDER, LYOPHILIZED, FOR SOLUTION INTRAVENOUS at 03:40

## 2021-08-19 RX ADMIN — ACETAMINOPHEN SCH MG: 500 TABLET, COATED ORAL at 03:38

## 2021-08-19 RX ADMIN — CETIRIZINE HYDROCHLORIDE SCH MG: 10 TABLET, FILM COATED ORAL at 08:58

## 2021-08-19 RX ADMIN — ENOXAPARIN SODIUM SCH MG: 40 INJECTION SUBCUTANEOUS at 11:23

## 2021-08-19 RX ADMIN — LOSARTAN POTASSIUM SCH MG: 50 TABLET, FILM COATED ORAL at 08:58

## 2021-08-19 RX ADMIN — PIPERACILLIN SODIUM AND TAZOBACTAM SODIUM SCH MLS/HR: 3; .375 INJECTION, POWDER, LYOPHILIZED, FOR SOLUTION INTRAVENOUS at 20:13

## 2021-08-19 RX ADMIN — FAMOTIDINE SCH MG: 20 TABLET, FILM COATED ORAL at 08:58

## 2021-08-19 RX ADMIN — SODIUM CHLORIDE, SODIUM LACTATE, POTASSIUM CHLORIDE, AND CALCIUM CHLORIDE SCH MLS/HR: .6; .31; .03; .02 INJECTION, SOLUTION INTRAVENOUS at 12:40

## 2021-08-19 RX ADMIN — PIPERACILLIN SODIUM AND TAZOBACTAM SODIUM SCH MLS/HR: 3; .375 INJECTION, POWDER, LYOPHILIZED, FOR SOLUTION INTRAVENOUS at 12:41

## 2021-08-19 RX ADMIN — ACETAMINOPHEN SCH MG: 500 TABLET, COATED ORAL at 16:00

## 2021-08-19 RX ADMIN — SODIUM CHLORIDE, SODIUM LACTATE, POTASSIUM CHLORIDE, AND CALCIUM CHLORIDE SCH MLS/HR: .6; .31; .03; .02 INJECTION, SOLUTION INTRAVENOUS at 23:15

## 2021-08-20 VITALS — DIASTOLIC BLOOD PRESSURE: 77 MMHG | SYSTOLIC BLOOD PRESSURE: 162 MMHG

## 2021-08-20 VITALS — SYSTOLIC BLOOD PRESSURE: 161 MMHG | DIASTOLIC BLOOD PRESSURE: 84 MMHG

## 2021-08-20 VITALS — DIASTOLIC BLOOD PRESSURE: 71 MMHG | SYSTOLIC BLOOD PRESSURE: 147 MMHG

## 2021-08-20 VITALS — DIASTOLIC BLOOD PRESSURE: 72 MMHG | SYSTOLIC BLOOD PRESSURE: 145 MMHG

## 2021-08-20 LAB
<PLATELET ESTIMATE>: (no result)
<PLT MORPHOLOGY>: (no result)
ANION GAP SERPL CALC-SCNC: 8 MMOL/L (ref 5–15)
ANISOCYTOSIS BLD QL SMEAR: (no result)
BAND#(MANUAL): 0.17 X10^3/UL
CALCIUM SERPL-MCNC: 8.6 MG/DL (ref 8.5–10.1)
CHLORIDE SERPL-SCNC: 108 MMOL/L (ref 98–107)
CREAT SERPL-MCNC: 1.26 MG/DL (ref 0.7–1.3)
EOS#(MANUAL): 0.33 X10^3/UL (ref 0–0.4)
EOS% (MANUAL): 2 % (ref 1–7)
ERYTHROCYTE [DISTWIDTH] IN BLOOD BY AUTOMATED COUNT: 15.8 % (ref 9.4–14.8)
HYPOCHROMIA BLD QL SMEAR: (no result)
LYMPH#(MANUAL): 1.98 X10^3/UL (ref 1–3.4)
LYMPHS% (MANUAL): 12 % (ref 22–44)
MCH RBC QN AUTO: 27.7 PG (ref 27.5–34.5)
MCHC RBC AUTO-ENTMCNC: 32.7 G/DL (ref 33.2–36.2)
METAMYELOCYTES# (MANUAL): 0.99 X10^3/UL (ref 0–0)
METAMYELOCYTES% (MANUAL): 6 % (ref 0–1)
MONOS#(MANUAL): 0.17 X10^3/UL (ref 0.3–2.7)
MONOS% (MANUAL): 1 % (ref 2–9)
MYELOCYTES# (MANUAL): 0.17 X10^3/UL (ref 0–0)
MYELOCYTES% (MANUAL): 1 % (ref 0–0)
NEUTS BAND NFR BLD: 1 % (ref 0–7)
PLATELET # BLD AUTO: 461 X10^3/UL (ref 130–400)
PMV BLD AUTO: 7.3 FL (ref 7.4–10.4)
RBC # BLD AUTO: 3.72 X10^6/UL (ref 4.38–5.82)
SEG#(MANUAL): 12.71 X10^3/UL (ref 1.8–6.8)
SEGS% (MANUAL): 77 % (ref 42–75)

## 2021-08-20 RX ADMIN — ENOXAPARIN SODIUM SCH MG: 40 INJECTION SUBCUTANEOUS at 14:22

## 2021-08-20 RX ADMIN — METOPROLOL TARTRATE SCH MG: 25 TABLET, FILM COATED ORAL at 06:21

## 2021-08-20 RX ADMIN — ALLOPURINOL SCH MG: 100 TABLET ORAL at 07:49

## 2021-08-20 RX ADMIN — PIPERACILLIN SODIUM AND TAZOBACTAM SODIUM SCH MLS/HR: 3; .375 INJECTION, POWDER, LYOPHILIZED, FOR SOLUTION INTRAVENOUS at 14:22

## 2021-08-20 RX ADMIN — ACETAMINOPHEN SCH MG: 500 TABLET, COATED ORAL at 20:59

## 2021-08-20 RX ADMIN — ACETAMINOPHEN SCH MG: 500 TABLET, COATED ORAL at 15:38

## 2021-08-20 RX ADMIN — METOPROLOL TARTRATE SCH MG: 25 TABLET, FILM COATED ORAL at 18:07

## 2021-08-20 RX ADMIN — FAMOTIDINE SCH MG: 20 TABLET, FILM COATED ORAL at 21:00

## 2021-08-20 RX ADMIN — ACETAMINOPHEN SCH MG: 500 TABLET, COATED ORAL at 04:00

## 2021-08-20 RX ADMIN — ACETAMINOPHEN SCH MG: 500 TABLET, COATED ORAL at 08:51

## 2021-08-20 RX ADMIN — LOSARTAN POTASSIUM SCH MG: 50 TABLET, FILM COATED ORAL at 07:49

## 2021-08-20 RX ADMIN — FAMOTIDINE SCH MG: 20 TABLET, FILM COATED ORAL at 07:49

## 2021-08-20 RX ADMIN — PIPERACILLIN SODIUM AND TAZOBACTAM SODIUM SCH MLS/HR: 3; .375 INJECTION, POWDER, LYOPHILIZED, FOR SOLUTION INTRAVENOUS at 04:40

## 2021-08-20 RX ADMIN — PIPERACILLIN SODIUM AND TAZOBACTAM SODIUM SCH MLS/HR: 3; .375 INJECTION, POWDER, LYOPHILIZED, FOR SOLUTION INTRAVENOUS at 20:03

## 2021-08-20 RX ADMIN — FINASTERIDE SCH MG: 5 TABLET, FILM COATED ORAL at 07:52

## 2021-08-20 RX ADMIN — MONTELUKAST SODIUM SCH MG: 10 TABLET ORAL at 20:59

## 2021-08-20 RX ADMIN — CETIRIZINE HYDROCHLORIDE SCH MG: 10 TABLET, FILM COATED ORAL at 07:49

## 2021-08-20 RX ADMIN — TAMSULOSIN HYDROCHLORIDE SCH MG: 0.4 CAPSULE ORAL at 07:49

## 2021-08-21 VITALS — SYSTOLIC BLOOD PRESSURE: 157 MMHG | DIASTOLIC BLOOD PRESSURE: 76 MMHG

## 2021-08-21 VITALS — SYSTOLIC BLOOD PRESSURE: 182 MMHG | DIASTOLIC BLOOD PRESSURE: 87 MMHG

## 2021-08-21 VITALS — DIASTOLIC BLOOD PRESSURE: 78 MMHG | SYSTOLIC BLOOD PRESSURE: 164 MMHG

## 2021-08-21 LAB
<PLATELET ESTIMATE>: (no result)
<PLT MORPHOLOGY>: (no result)
ANION GAP SERPL CALC-SCNC: 7 MMOL/L (ref 5–15)
ANISOCYTOSIS BLD QL SMEAR: (no result)
BAND#(MANUAL): 0.14 X10^3/UL
CALCIUM SERPL-MCNC: 8.8 MG/DL (ref 8.5–10.1)
CHLORIDE SERPL-SCNC: 110 MMOL/L (ref 98–107)
CREAT SERPL-MCNC: 1.31 MG/DL (ref 0.7–1.3)
EOS#(MANUAL): 0.7 X10^3/UL (ref 0–0.4)
EOS% (MANUAL): 5 % (ref 1–7)
ERYTHROCYTE [DISTWIDTH] IN BLOOD BY AUTOMATED COUNT: 16 % (ref 9.4–14.8)
LYMPH#(MANUAL): 1.68 X10^3/UL (ref 1–3.4)
LYMPHS% (MANUAL): 12 % (ref 22–44)
MCH RBC QN AUTO: 27.5 PG (ref 27.5–34.5)
MCHC RBC AUTO-ENTMCNC: 32.6 G/DL (ref 33.2–36.2)
METAMYELOCYTES# (MANUAL): 0.42 X10^3/UL (ref 0–0)
METAMYELOCYTES% (MANUAL): 3 % (ref 0–1)
MONOS#(MANUAL): 0.42 X10^3/UL (ref 0.3–2.7)
MONOS% (MANUAL): 3 % (ref 2–9)
MYELOCYTES# (MANUAL): 0.28 X10^3/UL (ref 0–0)
MYELOCYTES% (MANUAL): 2 % (ref 0–0)
NEUTS BAND NFR BLD: 1 % (ref 0–7)
PLATELET # BLD AUTO: 449 X10^3/UL (ref 130–400)
PMV BLD AUTO: 7 FL (ref 7.4–10.4)
POLYCHROMASIA BLD QL SMEAR: (no result)
RBC # BLD AUTO: 3.66 X10^6/UL (ref 4.38–5.82)
SEG#(MANUAL): 10.36 X10^3/UL (ref 1.8–6.8)
SEGS% (MANUAL): 74 % (ref 42–75)

## 2021-08-21 RX ADMIN — FAMOTIDINE SCH MG: 20 TABLET, FILM COATED ORAL at 08:57

## 2021-08-21 RX ADMIN — FINASTERIDE SCH MG: 5 TABLET, FILM COATED ORAL at 08:59

## 2021-08-21 RX ADMIN — METOPROLOL TARTRATE SCH MG: 25 TABLET, FILM COATED ORAL at 05:28

## 2021-08-21 RX ADMIN — CETIRIZINE HYDROCHLORIDE SCH MG: 10 TABLET, FILM COATED ORAL at 08:55

## 2021-08-21 RX ADMIN — PIPERACILLIN SODIUM AND TAZOBACTAM SODIUM SCH MLS/HR: 3; .375 INJECTION, POWDER, LYOPHILIZED, FOR SOLUTION INTRAVENOUS at 03:45

## 2021-08-21 RX ADMIN — PIPERACILLIN SODIUM AND TAZOBACTAM SODIUM SCH MLS/HR: 3; .375 INJECTION, POWDER, LYOPHILIZED, FOR SOLUTION INTRAVENOUS at 12:00

## 2021-08-21 RX ADMIN — ACETAMINOPHEN SCH MG: 500 TABLET, COATED ORAL at 03:46

## 2021-08-21 RX ADMIN — ACETAMINOPHEN SCH MG: 500 TABLET, COATED ORAL at 08:55

## 2021-08-21 RX ADMIN — TAMSULOSIN HYDROCHLORIDE SCH MG: 0.4 CAPSULE ORAL at 08:54

## 2021-08-21 RX ADMIN — LOSARTAN POTASSIUM SCH MG: 50 TABLET, FILM COATED ORAL at 08:58

## 2021-08-21 RX ADMIN — ALLOPURINOL SCH MG: 100 TABLET ORAL at 08:56

## 2021-10-05 NOTE — ANESTHESIA TIME REPORT
Anesthesia Start and Stop Event Times     Date Time Event    2/22/2021 1617 Ready for Procedure     1719 Anesthesia Start     2023 Anesthesia Stop        Responsible Staff  02/22/21    Name Role Begin End    Yonas Dempsey M.D. Anesth 1719 2023        Preop Diagnosis (Free Text):  Pre-op Diagnosis     LUMBAR STENOSIS        Preop Diagnosis (Codes):    Post op Diagnosis  Lumbar stenosis      Premium Reason  A. 3PM - 7AM    Comments:                                                                       
Yes

## 2022-09-12 ENCOUNTER — HOSPITAL ENCOUNTER (OUTPATIENT)
Facility: MEDICAL CENTER | Age: 72
End: 2022-09-12
Attending: STUDENT IN AN ORGANIZED HEALTH CARE EDUCATION/TRAINING PROGRAM
Payer: MEDICARE

## 2022-09-12 PROCEDURE — 87086 URINE CULTURE/COLONY COUNT: CPT

## 2022-09-15 LAB
BACTERIA UR CULT: NORMAL
SIGNIFICANT IND 70042: NORMAL
SITE SITE: NORMAL
SOURCE SOURCE: NORMAL

## 2023-03-06 NOTE — NUR
[place] [facility] at bedside for COVID swab collection.  pt and wife at bedside aware 
that pt to be admitted [Well Developed] : well developed [Well Nourished] : well nourished [No Acute Distress] : no acute distress [Normal Conjunctiva] : normal conjunctiva [Normal Venous Pressure] : normal venous pressure [No Carotid Bruit] : no carotid bruit [Normal S1, S2] : normal S1, S2 [No Murmur] : no murmur [No Rub] : no rub [No Gallop] : no gallop [Clear Lung Fields] : clear lung fields [Good Air Entry] : good air entry [No Respiratory Distress] : no respiratory distress  [Soft] : abdomen soft [Non Tender] : non-tender [No Masses/organomegaly] : no masses/organomegaly [Normal Bowel Sounds] : normal bowel sounds [Normal Gait] : normal gait [No Edema] : no edema [No Cyanosis] : no cyanosis [No Clubbing] : no clubbing [No Varicosities] : no varicosities [No Rash] : no rash [No Skin Lesions] : no skin lesions [Moves all extremities] : moves all extremities [No Focal Deficits] : no focal deficits [Normal Speech] : normal speech [Alert and Oriented] : alert and oriented [Normal memory] : normal memory

## (undated) DEVICE — NEEDLE SPINAL NON-SAFETY 18 GA X 3 IN (25EA/BX)

## (undated) DEVICE — ENDO PEANUT 5MM DEVICE (12EA/BX)

## (undated) DEVICE — GLOVE SZ 7 BIOGEL PI MICRO - PF LF (50PR/BX 4BX/CA)

## (undated) DEVICE — DRAPE SURG STERI-DRAPE 7X11OD - (40EA/CA)

## (undated) DEVICE — GLOVE SZ 8 BIOGEL PI MICRO - PF LF (50PR/BX)

## (undated) DEVICE — DILATOR NOTTINGHAM 6F-10FRX70CM

## (undated) DEVICE — HEMOSTAT SURG ABSORBABLE - 2 X 3 IN SURGICEL (24EA/CA)

## (undated) DEVICE — MASK ANESTHESIA ADULT  - (100/CA)

## (undated) DEVICE — DRAPE 36X28IN RAD CARM BND BG - (25/CA) O

## (undated) DEVICE — GLOVE BIOGEL SZ 7.5 SURGICAL PF LTX - (50PR/BX 4BX/CA)

## (undated) DEVICE — LIGHT CABLE

## (undated) DEVICE — GOWN SURGEONS LARGE - (32/CA)

## (undated) DEVICE — ZIPWIRE .038 STRAIGHT BENTSON TIP (5EA/BX)

## (undated) DEVICE — GLOVE BIOGEL PI INDICATOR SZ 6.5 SURGICAL PF LF - (50/BX 4BX/CA)

## (undated) DEVICE — BOVIE BLADE COATED &INSULATED - 25/PK

## (undated) DEVICE — GLOVE BIOGEL SZ 6.5 SURGICAL PF LTX (50PR/BX 4BX/CA)

## (undated) DEVICE — SHIM

## (undated) DEVICE — CLOSURE SKIN STRIP 1/2 X 4 IN - (STERI STRIP) (50/BX 4BX/CA)

## (undated) DEVICE — CHLORAPREP 26 ML APPLICATOR - ORANGE TINT(25/CA)

## (undated) DEVICE — DRAPE STRLE REG TOWEL 18X24 - (10/BX 4BX/CA)"

## (undated) DEVICE — SPONGE GAUZESTER 4 X 4 4PLY - (128PK/CA)

## (undated) DEVICE — HEADREST PRONEVIEW LARGE - (10/CA)

## (undated) DEVICE — ELECTRODE 850 FOAM ADHESIVE - HYDROGEL RADIOTRNSPRNT (50/PK)

## (undated) DEVICE — STIM CLIP

## (undated) DEVICE — SET LEADWIRE 5 LEAD BEDSIDE DISPOSABLE ECG (1SET OF 5/EA)

## (undated) DEVICE — SODIUM CHL IRRIGATION 0.9% 1000ML (12EA/CA)

## (undated) DEVICE — GLOVE, BIOGEL ECLIPSE, SZ 7.0, PF LTX (50/BX)

## (undated) DEVICE — SUCTION INSTRUMENT YANKAUER BULBOUS TIP W/O VENT (50EA/CA)

## (undated) DEVICE — SET EXTENSION WITH 2 PORTS (48EA/CA) ***PART #2C8610 IS A SUBSTITUTE*****

## (undated) DEVICE — SURGIFOAM (12X7) - (12EA/CA)

## (undated) DEVICE — SUTURE 4-0 MONOCRYL PLUS PS-1 - 27 INCH (36/BX)

## (undated) DEVICE — KIT ROOM DECONTAMINATION

## (undated) DEVICE — CANISTER SUCTION 3000ML MECHANICAL FILTER AUTO SHUTOFF MEDI-VAC NONSTERILE LF DISP  (40EA/CA)

## (undated) DEVICE — GOWN WARMING STANDARD FLEX - (30/CA)

## (undated) DEVICE — GLOVE SZ 7.5 BIOGEL PI MICRO - PF LF (50PR/BX)

## (undated) DEVICE — SLEEVE, VASO, THIGH, MED

## (undated) DEVICE — SHEET TRANSVERSE LAP - (12EA/CA)

## (undated) DEVICE — WATER IRRIGATION STERILE 1000ML (12EA/CA)

## (undated) DEVICE — LACTATED RINGERS INJ 1000 ML - (14EA/CA 60CA/PF)

## (undated) DEVICE — PACK CYSTO III (4EA/CA)

## (undated) DEVICE — DISSECT TOOL MIDAS 14BA50

## (undated) DEVICE — GLIDECATH ANGLE 4F X 100CM (5EA/BX)

## (undated) DEVICE — SUTURE 3-0 VICRYL PLUS RB-1 - 8 X 18 INCH (12/BX)

## (undated) DEVICE — PROTECTOR ULNA NERVE - (36PR/CA)

## (undated) DEVICE — MIDAS LUBRICATOR DIFFUSER PACK (4EA/CA)

## (undated) DEVICE — SUCTION TIP STRAIGHT ARGYLE - 50EA/CA

## (undated) DEVICE — GLOVE BIOGEL PI INDICATOR SZ 7.0 SURGICAL PF LF - (50/BX 4BX/CA)

## (undated) DEVICE — PACK JACKSON TABLE KIT W/OUT - HR (6EA/CA)

## (undated) DEVICE — TUBING CLEARLINK DUO-VENT - C-FLO (48EA/CA)

## (undated) DEVICE — HEAD HOLDER JUNIOR/ADULT

## (undated) DEVICE — WATER IRRIG. STER 3000 ML - (4/CA)

## (undated) DEVICE — COVER FOOT UNIVERSAL DISP. - (25EA/CA)

## (undated) DEVICE — KIT SURGIFLO W/OUT THROMBIN - (6EA/CA)

## (undated) DEVICE — TUBE CONNECT SUCTION CLEAR 120 X 1/4" (50EA/CA)"

## (undated) DEVICE — SUTURE GENERAL

## (undated) DEVICE — ARMREST CRADLE FOAM - (2PR/PK 12PR/CA)

## (undated) DEVICE — NEPTUNE 4 PORT MANIFOLD - (20/PK)

## (undated) DEVICE — Device

## (undated) DEVICE — K-WIRE

## (undated) DEVICE — STIM PROBE

## (undated) DEVICE — SENSOR SPO2 NEO LNCS ADHESIVE (20/BX) SEE USER NOTES

## (undated) DEVICE — TUBING C&T SET FLYING LEADS DRAIN TUBING (10EA/BX)

## (undated) DEVICE — LACTATED RINGERS INJ. 500 ML - (24EA/CA)

## (undated) DEVICE — SUTURE 0 VICRYL PLUS CT-1 - 8 X 18 INCH (12/BX)

## (undated) DEVICE — DRAPE LARGE 3 QUARTER - (20/CA)

## (undated) DEVICE — TOWELS CLOTH SURGICAL - (4/PK 20PK/CA)

## (undated) DEVICE — PACK NEURO - (2EA/CA)

## (undated) DEVICE — BOVIE  BLADE 6 EXTENDED - (50/PK)

## (undated) DEVICE — KIT ANESTHESIA W/CIRCUIT & 3/LT BAG W/FILTER (20EA/CA)

## (undated) DEVICE — ELECTRODE DUAL RETURN W/ CORD - (50/PK)

## (undated) DEVICE — DRAPE C ARMOR (12EA/CA)

## (undated) DEVICE — SUTURE 2-0 VICRYL PLUS CT-1 - 8 X 18 INCH(12/BX)

## (undated) DEVICE — STERI STRIP COMPOUND BENZOIN - TINCTURE 0.6ML WITH APPLICATOR (40EA/BX)

## (undated) DEVICE — NEEDLE NON SAFETY HYPO 22 GA X 1 1/2 IN (100/BX)

## (undated) DEVICE — GLOVE SZ 6.5 BIOGEL PI MICRO - PF LF (50PR/BX)

## (undated) DEVICE — INTRAOP NEURO IN OR 1:1 PER 15 MIN

## (undated) DEVICE — CONNECTOR HOSE NEPTUNE FOR CYSTO ROOM

## (undated) DEVICE — TOOL DISSECT MATCH HEAD

## (undated) DEVICE — BAG URODRAIN WITH TUBING - (20/CA)